# Patient Record
Sex: FEMALE | Race: WHITE | NOT HISPANIC OR LATINO | Employment: OTHER | URBAN - METROPOLITAN AREA
[De-identification: names, ages, dates, MRNs, and addresses within clinical notes are randomized per-mention and may not be internally consistent; named-entity substitution may affect disease eponyms.]

---

## 2019-11-12 ENCOUNTER — OFFICE VISIT (OUTPATIENT)
Dept: UROLOGY | Facility: CLINIC | Age: 64
End: 2019-11-12
Payer: MEDICARE

## 2019-11-12 VITALS
HEIGHT: 65 IN | SYSTOLIC BLOOD PRESSURE: 120 MMHG | WEIGHT: 165 LBS | HEART RATE: 64 BPM | BODY MASS INDEX: 27.49 KG/M2 | DIASTOLIC BLOOD PRESSURE: 62 MMHG

## 2019-11-12 DIAGNOSIS — N30.10 INTERSTITIAL CYSTITIS: Primary | ICD-10-CM

## 2019-11-12 DIAGNOSIS — R32 FEMALE INCONTINENCE: ICD-10-CM

## 2019-11-12 LAB
BILIRUB UR QL STRIP: NEGATIVE
GLUCOSE UR QL STRIP: POSITIVE
KETONES UR QL STRIP: POSITIVE
LEUKOCYTE ESTERASE UR QL STRIP: NEGATIVE
PH, POC UA: 5
POC AMORP, URINE: ABNORMAL
POC BACTI, URINE: ABNORMAL
POC BLOOD, URINE: NEGATIVE
POC CASTS, URINE: ABNORMAL
POC CRYST, URINE: ABNORMAL
POC EPITH, URINE: ABNORMAL
POC HCG, URINE: ABNORMAL
POC HYALIN, URINE: ABNORMAL LPF
POC MUCUS, URINE: ABNORMAL
POC NITRATES, URINE: NEGATIVE
POC OTHER, URINE: ABNORMAL
POC RBC, URINE: ABNORMAL HPF
POC WBC, URINE: ABNORMAL HPF
PROT UR QL STRIP: NEGATIVE
SP GR UR STRIP: 1.01 (ref 1–1.03)
UROBILINOGEN UR STRIP-ACNC: 0.2 (ref 0.1–1.1)

## 2019-11-12 PROCEDURE — 81001 POCT URINALYSIS (W/MICRO OPTION): ICD-10-PCS | Mod: S$GLB,,, | Performed by: UROLOGY

## 2019-11-12 PROCEDURE — 99213 OFFICE O/P EST LOW 20 MIN: CPT | Mod: 25,S$GLB,, | Performed by: UROLOGY

## 2019-11-12 PROCEDURE — 81001 URINALYSIS AUTO W/SCOPE: CPT | Mod: S$GLB,,, | Performed by: UROLOGY

## 2019-11-12 PROCEDURE — 99213 PR OFFICE/OUTPT VISIT, EST, LEVL III, 20-29 MIN: ICD-10-PCS | Mod: 25,S$GLB,, | Performed by: UROLOGY

## 2019-11-12 RX ORDER — SIMVASTATIN 20 MG/1
20 TABLET, FILM COATED ORAL NIGHTLY
Refills: 1 | COMMUNITY
Start: 2019-09-23 | End: 2021-08-23

## 2019-11-12 RX ORDER — MELOXICAM 7.5 MG/1
7.5 TABLET ORAL 2 TIMES DAILY PRN
Refills: 2 | COMMUNITY
Start: 2019-11-04 | End: 2020-08-20

## 2019-11-12 RX ORDER — GABAPENTIN 300 MG/1
CAPSULE ORAL
Refills: 4 | COMMUNITY
Start: 2019-10-22 | End: 2021-08-23

## 2019-11-12 RX ORDER — FENOFIBRATE 160 MG/1
160 TABLET ORAL DAILY
Refills: 1 | COMMUNITY
Start: 2019-10-24

## 2019-11-12 RX ORDER — PANTOPRAZOLE SODIUM 40 MG/1
TABLET, DELAYED RELEASE ORAL
Refills: 11 | COMMUNITY
Start: 2019-10-31

## 2019-11-12 RX ORDER — ROPINIROLE 2 MG/1
2 TABLET, FILM COATED ORAL NIGHTLY
Refills: 5 | COMMUNITY
Start: 2019-10-28 | End: 2021-08-23

## 2019-11-12 RX ORDER — AMITRIPTYLINE HYDROCHLORIDE 25 MG/1
50 TABLET, FILM COATED ORAL NIGHTLY
Refills: 6 | COMMUNITY
Start: 2019-11-08 | End: 2020-06-29 | Stop reason: SDUPTHER

## 2019-11-12 RX ORDER — METOPROLOL SUCCINATE 50 MG/1
50 TABLET, EXTENDED RELEASE ORAL DAILY
Refills: 5 | COMMUNITY
Start: 2019-10-22

## 2019-11-12 RX ORDER — ESTRADIOL 2 MG/1
2 TABLET ORAL DAILY
Refills: 11 | COMMUNITY
Start: 2019-10-31 | End: 2020-09-04

## 2019-11-12 RX ORDER — LISINOPRIL 10 MG/1
10 TABLET ORAL DAILY
Refills: 5 | COMMUNITY
Start: 2019-10-24 | End: 2023-09-14

## 2019-11-12 NOTE — PROGRESS NOTES
Subjective:       Patient ID: Erika Espinosa is a 64 y.o. female.    Chief Complaint: Other (6 mth F/U on IC, stress incotinence, cystocele,rectocele. Pt. states she had to stop taking Mybetriq due to the cost)      HPI: IC on elavil and diet.  Doing well with that.  H/o stress incontinence no leakage surgery was in 2012      Past Medical History:   Past Medical History:   Diagnosis Date    Depression     Diabetes     Hiatal hernia     Hyperlipidemia     Hypertension     IC (interstitial cystitis)     Peripheral neuropathy        Past Surgical Historical:   Past Surgical History:   Procedure Laterality Date    BACK SURGERY      bladder sling amp repair      HYSTERECTOMY      TOTAL HIP ARTHROPLASTY      bilat        Medications:   Medication List with Changes/Refills   Current Medications    AMITRIPTYLINE (ELAVIL) 25 MG TABLET    Take 50 mg by mouth nightly.    ESTRADIOL (ESTRACE) 2 MG TABLET    Take 2 mg by mouth once daily.    FENOFIBRATE 160 MG TAB    Take 160 mg by mouth once daily.    GABAPENTIN (NEURONTIN) 300 MG CAPSULE    TAKE 1 CAPSULE BY MOUTH 4 TIMES DAILY    LISINOPRIL 10 MG TABLET    Take 10 mg by mouth once daily.    MELOXICAM (MOBIC) 7.5 MG TABLET    Take 7.5 mg by mouth 2 (two) times daily as needed.    METOPROLOL SUCCINATE (TOPROL-XL) 50 MG 24 HR TABLET    Take 50 mg by mouth once daily.    PANTOPRAZOLE (PROTONIX) 40 MG TABLET    TAKE 1 TABLET BY MOUTH ONCE DAILY 1 2 HOUR PRIOR TO MORNING MEAL ON AN EMPTY STOMACH    ROPINIROLE (REQUIP) 2 MG TABLET    Take 2 mg by mouth every evening.    SIMVASTATIN (ZOCOR) 20 MG TABLET    Take 20 mg by mouth every evening.        Past Social History:   Social History     Socioeconomic History    Marital status:      Spouse name: Not on file    Number of children: Not on file    Years of education: Not on file    Highest education level: Not on file   Occupational History    Not on file   Social Needs    Financial resource strain: Not on file     Food insecurity:     Worry: Not on file     Inability: Not on file    Transportation needs:     Medical: Not on file     Non-medical: Not on file   Tobacco Use    Smoking status: Never Smoker   Substance and Sexual Activity    Alcohol use: Not Currently    Drug use: Not on file    Sexual activity: Not on file   Lifestyle    Physical activity:     Days per week: Not on file     Minutes per session: Not on file    Stress: Not on file   Relationships    Social connections:     Talks on phone: Not on file     Gets together: Not on file     Attends Pentecostal service: Not on file     Active member of club or organization: Not on file     Attends meetings of clubs or organizations: Not on file     Relationship status: Not on file   Other Topics Concern    Not on file   Social History Narrative    Not on file       Allergies: Review of patient's allergies indicates:  No Known Allergies     Family History:   Family History   Problem Relation Age of Onset    Cancer Father     Hypertension Mother     Heart disease Mother         Review of Systems:  Review of Systems   Constitutional: Negative for activity change and appetite change.   HENT: Negative for congestion and dental problem.    Respiratory: Negative for chest tightness and shortness of breath.    Cardiovascular: Negative for chest pain.   Gastrointestinal: Negative for abdominal distention.   Genitourinary: Negative for decreased urine volume, difficulty urinating, dyspareunia, dysuria, enuresis, flank pain, frequency, genital sores, hematuria, pelvic pain and urgency.   Musculoskeletal: Negative for back pain and neck pain.   Neurological: Negative for dizziness.   Hematological: Negative for adenopathy.   Psychiatric/Behavioral: Negative for agitation, behavioral problems and confusion.       Physical Exam:  Physical Exam   Nursing note and vitals reviewed.  Constitutional: She is oriented to person, place, and time. She appears well-developed and  well-nourished.   HENT:   Head: Normocephalic.   Cardiovascular: Normal rate, regular rhythm and normal heart sounds.    Pulmonary/Chest: Effort normal and breath sounds normal.   Abdominal: Soft. Bowel sounds are normal.   Neurological: She is alert and oriented to person, place, and time.   Skin: Skin is warm and dry.         Assessment/Plan:     IC doing well on present regimine.  Med refilled.  Stress incontinence no evidence of recurrence.  Fu 6 months  Problem List Items Addressed This Visit     None

## 2020-05-06 ENCOUNTER — OFFICE VISIT (OUTPATIENT)
Dept: UROLOGY | Facility: CLINIC | Age: 65
End: 2020-05-06
Payer: MEDICARE

## 2020-05-06 VITALS
SYSTOLIC BLOOD PRESSURE: 122 MMHG | BODY MASS INDEX: 26.84 KG/M2 | RESPIRATION RATE: 17 BRPM | HEIGHT: 66 IN | DIASTOLIC BLOOD PRESSURE: 76 MMHG | WEIGHT: 167 LBS

## 2020-05-06 DIAGNOSIS — N30.10 INTERSTITIAL CYSTITIS: Primary | ICD-10-CM

## 2020-05-06 DIAGNOSIS — R32 FEMALE INCONTINENCE: ICD-10-CM

## 2020-05-06 LAB
BILIRUB UR QL STRIP: NEGATIVE
GLUCOSE UR QL STRIP: POSITIVE
KETONES UR QL STRIP: NEGATIVE
LEUKOCYTE ESTERASE UR QL STRIP: NEGATIVE
PH, POC UA: 5
POC AMORP, URINE: ABNORMAL
POC BACTI, URINE: ABNORMAL
POC BLOOD, URINE: NEGATIVE
POC CASTS, URINE: ABNORMAL
POC CRYST, URINE: ABNORMAL
POC EPITH, URINE: ABNORMAL
POC HCG, URINE: ABNORMAL
POC HYALIN, URINE: ABNORMAL
POC MUCUS, URINE: ABNORMAL
POC NITRATES, URINE: NEGATIVE
POC OTHER, URINE: ABNORMAL
POC RBC, URINE: ABNORMAL
POC WBC, URINE: ABNORMAL
PROT UR QL STRIP: NEGATIVE
SP GR UR STRIP: 1.01 (ref 1–1.03)
UROBILINOGEN UR STRIP-ACNC: 0.2 (ref 0.1–1.1)

## 2020-05-06 PROCEDURE — 99214 OFFICE O/P EST MOD 30 MIN: CPT | Mod: 25,S$GLB,, | Performed by: UROLOGY

## 2020-05-06 PROCEDURE — 99214 PR OFFICE/OUTPT VISIT, EST, LEVL IV, 30-39 MIN: ICD-10-PCS | Mod: 25,S$GLB,, | Performed by: UROLOGY

## 2020-05-06 PROCEDURE — 51701 INSERT BLADDER CATHETER: CPT | Mod: S$GLB,,, | Performed by: UROLOGY

## 2020-05-06 PROCEDURE — 51701 INSERT,NON-INDWELLING BLADDER CATHETER: ICD-10-PCS | Mod: S$GLB,,, | Performed by: UROLOGY

## 2020-05-06 RX ORDER — EMPAGLIFLOZIN 25 MG/1
1 TABLET, FILM COATED ORAL DAILY
COMMUNITY
Start: 2020-03-30

## 2020-05-06 NOTE — PROGRESS NOTES
Subjective:       Patient ID: Erika Espinosa is a 64 y.o. female.    Chief Complaint: Urinary Incontinence and Nocturia (every 2 hours)      HPI: 63 yo female fu history of wendie and IC.  Pt is experiencing a lot of urgency frequency and incontinence.  Patient is getting up at night every two hours.  Pt states when she gets the urge she has to go right then.  Pt is also a diabetic       Past Medical History:   Past Medical History:   Diagnosis Date    Depression     Diabetes     Hiatal hernia     Hyperlipidemia     Hypertension     IC (interstitial cystitis)     Peripheral neuropathy        Past Surgical Historical:   Past Surgical History:   Procedure Laterality Date    BACK SURGERY      bladder sling amp repair      HYSTERECTOMY      TOTAL HIP ARTHROPLASTY      bilat        Medications:   Medication List with Changes/Refills   Current Medications    AMITRIPTYLINE (ELAVIL) 25 MG TABLET    Take 50 mg by mouth nightly.    ESTRADIOL (ESTRACE) 2 MG TABLET    Take 2 mg by mouth once daily.    FENOFIBRATE 160 MG TAB    Take 160 mg by mouth once daily.    GABAPENTIN (NEURONTIN) 300 MG CAPSULE    TAKE 1 CAPSULE BY MOUTH 4 TIMES DAILY    JARDIANCE 25 MG TAB    Take 1 tablet by mouth once daily.    LISINOPRIL 10 MG TABLET    Take 10 mg by mouth once daily.    MELOXICAM (MOBIC) 7.5 MG TABLET    Take 7.5 mg by mouth 2 (two) times daily as needed.    METOPROLOL SUCCINATE (TOPROL-XL) 50 MG 24 HR TABLET    Take 50 mg by mouth once daily.    PANTOPRAZOLE (PROTONIX) 40 MG TABLET    TAKE 1 TABLET BY MOUTH ONCE DAILY 1 2 HOUR PRIOR TO MORNING MEAL ON AN EMPTY STOMACH    ROPINIROLE (REQUIP) 2 MG TABLET    Take 2 mg by mouth every evening.    SIMVASTATIN (ZOCOR) 20 MG TABLET    Take 20 mg by mouth every evening.    SITAGLIPTAN-METFORMIN (JANUMET) 50-1,000 MG PER TABLET    Take 1 tablet by mouth 2 (two) times daily with meals.        Past Social History:   Social History     Socioeconomic History    Marital status:       Spouse name: Not on file    Number of children: Not on file    Years of education: Not on file    Highest education level: Not on file   Occupational History    Not on file   Social Needs    Financial resource strain: Not on file    Food insecurity:     Worry: Not on file     Inability: Not on file    Transportation needs:     Medical: Not on file     Non-medical: Not on file   Tobacco Use    Smoking status: Never Smoker   Substance and Sexual Activity    Alcohol use: Not Currently    Drug use: Not on file    Sexual activity: Not on file   Lifestyle    Physical activity:     Days per week: Not on file     Minutes per session: Not on file    Stress: Not on file   Relationships    Social connections:     Talks on phone: Not on file     Gets together: Not on file     Attends Bahai service: Not on file     Active member of club or organization: Not on file     Attends meetings of clubs or organizations: Not on file     Relationship status: Not on file   Other Topics Concern    Not on file   Social History Narrative    Not on file       Allergies: Review of patient's allergies indicates:  No Known Allergies     Family History:   Family History   Problem Relation Age of Onset    Cancer Father     Hypertension Mother     Heart disease Mother         Review of Systems:  Review of Systems   Constitutional: Negative for activity change and appetite change.   HENT: Negative for congestion and dental problem.    Respiratory: Negative for chest tightness and shortness of breath.    Cardiovascular: Negative for chest pain.   Gastrointestinal: Negative for abdominal distention and abdominal pain.   Genitourinary: Positive for frequency and urgency. Negative for decreased urine volume, difficulty urinating, dyspareunia, dysuria, enuresis, flank pain, genital sores, hematuria and pelvic pain.   Musculoskeletal: Negative for back pain and neck pain.   Allergic/Immunologic: Negative for immunocompromised state.    Neurological: Negative for dizziness.   Hematological: Negative for adenopathy.   Psychiatric/Behavioral: Negative for agitation, behavioral problems and confusion.       Physical Exam:  Physical Exam   Nursing note and vitals reviewed.  Constitutional: She is oriented to person, place, and time. She appears well-developed and well-nourished.   HENT:   Head: Normocephalic.   Eyes: Pupils are equal, round, and reactive to light.   Neck: Normal range of motion. Neck supple.   Cardiovascular: Normal rate, regular rhythm and normal heart sounds.    Pulmonary/Chest: Effort normal and breath sounds normal.   Abdominal: Soft. Bowel sounds are normal.   Genitourinary:         Musculoskeletal: Normal range of motion.   Neurological: She is alert and oriented to person, place, and time.   Skin: Skin is warm and dry.     Psychiatric: She has a normal mood and affect. Her behavior is normal.   ua is nl except for sugar    Assessment/Plan:     urgenecy with urge incontinence and IC--will place on ditropan and have fu in 4 weeks if no improvement will consider botox  Problem List Items Addressed This Visit     None      Visit Diagnoses     Interstitial cystitis    -  Primary    Relevant Orders    POCT Urinalysis (w/Micro Option)    INSERT,NON-INDWELLING BLADDER CATHETER    Female incontinence        Relevant Orders    POCT Urinalysis (w/Micro Option)    INSERT,NON-INDWELLING BLADDER CATHETER

## 2020-06-03 ENCOUNTER — OFFICE VISIT (OUTPATIENT)
Dept: UROLOGY | Facility: CLINIC | Age: 65
End: 2020-06-03
Payer: MEDICARE

## 2020-06-03 VITALS — SYSTOLIC BLOOD PRESSURE: 125 MMHG | RESPIRATION RATE: 16 BRPM | DIASTOLIC BLOOD PRESSURE: 78 MMHG

## 2020-06-03 DIAGNOSIS — R32 FEMALE INCONTINENCE: Primary | ICD-10-CM

## 2020-06-03 DIAGNOSIS — N30.10 INTERSTITIAL CYSTITIS: ICD-10-CM

## 2020-06-03 DIAGNOSIS — R82.71 BACILLURIA: ICD-10-CM

## 2020-06-03 LAB
BILIRUB UR QL STRIP: NEGATIVE
CLARITY, POC UA: ABNORMAL
COLOR, POC UA: ABNORMAL
GLUCOSE UR QL STRIP: NEGATIVE
KETONES UR QL STRIP: NEGATIVE
LEUKOCYTE ESTERASE UR QL STRIP: POSITIVE
NITRITE, POC UA: ABNORMAL
PH, POC UA: 7
POC AMORP, URINE: ABNORMAL
POC BACTI, URINE: ABNORMAL
POC BLOOD, URINE: NEGATIVE
POC CASTS, URINE: ABNORMAL
POC CRYST, URINE: ABNORMAL
POC EPITH, URINE: ABNORMAL
POC HCG, URINE: ABNORMAL
POC HYALIN, URINE: ABNORMAL LPF
POC MUCUS, URINE: ABNORMAL
POC NITRATES, URINE: POSITIVE
POC OTHER, URINE: ABNORMAL
POC RBC, URINE: ABNORMAL HPF
POC RESIDUAL URINE VOLUME: 36 ML (ref 0–100)
POC WBC, URINE: ABNORMAL HPF
PROT UR QL STRIP: NEGATIVE
SP GR UR STRIP: 1.02 (ref 1–1.03)
UROBILINOGEN UR STRIP-ACNC: 0.2 (ref 0.1–1.1)

## 2020-06-03 PROCEDURE — 51798 US URINE CAPACITY MEASURE: CPT | Mod: S$GLB,,, | Performed by: UROLOGY

## 2020-06-03 PROCEDURE — 99213 OFFICE O/P EST LOW 20 MIN: CPT | Mod: S$GLB,,, | Performed by: UROLOGY

## 2020-06-03 PROCEDURE — 99213 PR OFFICE/OUTPT VISIT, EST, LEVL III, 20-29 MIN: ICD-10-PCS | Mod: S$GLB,,, | Performed by: UROLOGY

## 2020-06-03 PROCEDURE — 51798 POCT BLADDER SCAN: ICD-10-PCS | Mod: S$GLB,,, | Performed by: UROLOGY

## 2020-06-03 RX ORDER — OXYBUTYNIN CHLORIDE 5 MG/1
TABLET ORAL
COMMUNITY
Start: 2020-05-06 | End: 2020-08-20

## 2020-06-03 NOTE — PROGRESS NOTES
Subjective:       Patient ID: Erika Espinosa is a 64 y.o. female.    Chief Complaint: Urinary Incontinence (Reports no change with ditropan)      HPI:  64-year-old female known service Dr. Farias of followed for symptoms of IC, urinary urgency urge incontinence.  Previously struggle with stress incontinence which is resolved with sling/Anterior repairprocedure.  She continues urgency urge incontinence having failed Myrbetriq and oxybutynin.  She continues on low-dose amitriptyline for IC and symptoms of overactive bladder as well.  She remains symptomatic.  Dr. Farias discussed  Botox injections in the bladder if she failed her most recent trial of oxybutynin.  On presentation today she reports she is in some proceeding in that direction.  No other urologic complaints       Past Medical History:   Past Medical History:   Diagnosis Date    Depression     Diabetes     Hiatal hernia     Hyperlipidemia     Hypertension     IC (interstitial cystitis)     Peripheral neuropathy        Past Surgical Historical:   Past Surgical History:   Procedure Laterality Date    BACK SURGERY      bladder sling amp repair      HYSTERECTOMY      TOTAL HIP ARTHROPLASTY      bilat        Medications:   Medication List with Changes/Refills   Current Medications    AMITRIPTYLINE (ELAVIL) 25 MG TABLET    Take 50 mg by mouth nightly.    ESTRADIOL (ESTRACE) 2 MG TABLET    Take 2 mg by mouth once daily.    FENOFIBRATE 160 MG TAB    Take 160 mg by mouth once daily.    GABAPENTIN (NEURONTIN) 300 MG CAPSULE    TAKE 1 CAPSULE BY MOUTH 4 TIMES DAILY    JARDIANCE 25 MG TAB    Take 1 tablet by mouth once daily.    LISINOPRIL 10 MG TABLET    Take 10 mg by mouth once daily.    MELOXICAM (MOBIC) 7.5 MG TABLET    Take 7.5 mg by mouth 2 (two) times daily as needed.    METOPROLOL SUCCINATE (TOPROL-XL) 50 MG 24 HR TABLET    Take 50 mg by mouth once daily.    OXYBUTYNIN (DITROPAN) 5 MG TAB        PANTOPRAZOLE (PROTONIX) 40 MG TABLET    TAKE 1  TABLET BY MOUTH ONCE DAILY 1 2 HOUR PRIOR TO MORNING MEAL ON AN EMPTY STOMACH    ROPINIROLE (REQUIP) 2 MG TABLET    Take 2 mg by mouth every evening.    SIMVASTATIN (ZOCOR) 20 MG TABLET    Take 20 mg by mouth every evening.    SITAGLIPTAN-METFORMIN (JANUMET) 50-1,000 MG PER TABLET    Take 1 tablet by mouth 2 (two) times daily with meals.        Past Social History:   Social History     Socioeconomic History    Marital status:      Spouse name: Not on file    Number of children: Not on file    Years of education: Not on file    Highest education level: Not on file   Occupational History    Not on file   Social Needs    Financial resource strain: Not on file    Food insecurity:     Worry: Not on file     Inability: Not on file    Transportation needs:     Medical: Not on file     Non-medical: Not on file   Tobacco Use    Smoking status: Never Smoker   Substance and Sexual Activity    Alcohol use: Not Currently    Drug use: Not on file    Sexual activity: Not on file   Lifestyle    Physical activity:     Days per week: Not on file     Minutes per session: Not on file    Stress: Not on file   Relationships    Social connections:     Talks on phone: Not on file     Gets together: Not on file     Attends Scientology service: Not on file     Active member of club or organization: Not on file     Attends meetings of clubs or organizations: Not on file     Relationship status: Not on file   Other Topics Concern    Not on file   Social History Narrative    Not on file       Allergies: Review of patient's allergies indicates:  No Known Allergies     Family History:   Family History   Problem Relation Age of Onset    Cancer Father     Hypertension Mother     Heart disease Mother         Review of Systems:  Review of Systems   Constitutional: Negative for activity change and appetite change.   HENT: Negative for congestion and dental problem.    Respiratory: Negative for chest tightness and shortness of  breath.    Cardiovascular: Negative for chest pain.   Gastrointestinal: Negative for abdominal distention.   Genitourinary: Negative for decreased urine volume, difficulty urinating, dyspareunia, dysuria, enuresis, flank pain, frequency, genital sores, hematuria, pelvic pain and urgency.   Musculoskeletal: Negative for back pain and neck pain.   Allergic/Immunologic: Negative for immunocompromised state.   Neurological: Negative for dizziness.   Hematological: Negative for adenopathy.   Psychiatric/Behavioral: Negative for agitation, behavioral problems and confusion.       Physical Exam:  Physical Exam   Constitutional: She is oriented to person, place, and time. She appears well-developed and well-nourished.   HENT:   Head: Normocephalic and atraumatic.   Eyes: No scleral icterus.   Neck: Normal range of motion.   Cardiovascular: Intact distal pulses.    Pulmonary/Chest: Effort normal and breath sounds normal.   Abdominal: Soft. She exhibits no distension. There is no tenderness.   Genitourinary: Rectum normal and vagina normal. Pelvic exam was performed with patient supine. There is no rash, tenderness or lesion on the right labia. There is no rash, tenderness or lesion on the left labia.   Musculoskeletal: She exhibits no edema.   Neurological: She is alert and oriented to person, place, and time.   Skin: Skin is warm and dry.     Psychiatric: She has a normal mood and affect.       Assessment/Plan:    overactive bladder-- symptoms urinary urgency urge incontinence under resolved with conservative therapy.  Patient interested in Botox injections of the bladder.  This have been discussed previously with her by Dr. Farias.  I again went over the procedure and risks versus benefits questions answered to patient's satisfaction.  Procedure to be scheduled.      UTI --urinalysis WBC 20-30 RBC 0-2 3+ bacteria and nitrite positive.  PVR 36 mL.  Patient is asymptomatic other than her chronic urgency urge incontinence.   Will wait on sensitivities to prescribed ABX  Problem List Items Addressed This Visit     None      Visit Diagnoses     Female incontinence    -  Primary    Relevant Orders    POCT Bladder Scan

## 2020-06-04 ENCOUNTER — TELEPHONE (OUTPATIENT)
Dept: UROLOGY | Facility: CLINIC | Age: 65
End: 2020-06-04

## 2020-06-04 DIAGNOSIS — N39.41 URGE INCONTINENCE: Primary | ICD-10-CM

## 2020-06-04 LAB — URINE CULTURE, ROUTINE: NORMAL

## 2020-06-04 NOTE — TELEPHONE ENCOUNTER
----- Message from Ann Marie Coburn sent at 6/4/2020  2:45 PM CDT -----  Contact: pt  Please call need a time for to sign paper work [phone]

## 2020-06-05 ENCOUNTER — CLINICAL SUPPORT (OUTPATIENT)
Dept: UROLOGY | Facility: CLINIC | Age: 65
End: 2020-06-05
Payer: MEDICARE

## 2020-06-05 DIAGNOSIS — N39.41 URGE INCONTINENCE: Primary | ICD-10-CM

## 2020-06-17 LAB
ANION GAP SERPL CALC-SCNC: 12 MMOL/L (ref 3–11)
APPEARANCE, UA: CLEAR
BASOPHILS NFR SNV MANUAL: 0.9 % (ref 0–3)
BILIRUB UR QL STRIP: NEGATIVE MG/DL
BUN SERPL-MCNC: 29 MG/DL (ref 7–18)
BUN/CREAT SERPL: 22.65 RATIO (ref 7–18)
CALCIUM BLD-MCNC: POSITIVE MG/DL
CALCIUM SERPL-MCNC: 9.6 MG/DL (ref 8.8–10.5)
CHLORIDE SERPL-SCNC: 99 MMOL/L (ref 100–108)
CO2 SERPL-SCNC: 27 MMOL/L (ref 21–32)
COLOR UR: ABNORMAL
COVID-19 AB, IGM: NEGATIVE
CREAT SERPL-MCNC: 1.28 MG/DL (ref 0.55–1.02)
EOSINOPHIL NFR SNV MANUAL: 2.6 % (ref 1–3)
ERYTHROCYTE [DISTWIDTH] IN BLOOD BY AUTOMATED COUNT: 13.1 % (ref 12.5–18)
GFR ESTIMATION: 42
GLUCOSE (UA): 1000 MG/DL
GLUCOSE SERPL-MCNC: 155 MG/DL (ref 70–110)
HCT VFR BLD AUTO: 43.3 % (ref 37–47)
HGB BLD-MCNC: 13.8 G/DL (ref 12–16)
HGB UR QL STRIP: NEGATIVE /UL
KETONES UR QL STRIP: NEGATIVE MG/DL
LEUKOCYTE ESTERASE UR QL STRIP: NEGATIVE /UL
LYMPHOCYTES NFR SNV MANUAL: 36.9 % (ref 25–40)
MANUAL NRBC PER 100 CELLS: 0 %
MCH RBC QN AUTO: 28.6 PG (ref 27–31.2)
MCHC RBC AUTO-ENTMCNC: 31.9 G/DL (ref 31.8–35.4)
MCV RBC AUTO: 89.6 FL (ref 80–97)
MONOCYTES/100 LEUKOCYTES: 8.1 % (ref 1–15)
NEUTROPHILS NFR BLD: 4.19 10*3/UL (ref 1.8–7.7)
NEUTROPHILS NFR SNV MANUAL: 51.3 % (ref 37–80)
NITRITE UR QL STRIP: NEGATIVE
PH UR STRIP: 5 PH (ref 5–9)
PLATELETS: 132 10*3/UL (ref 142–424)
POTASSIUM SERPL-SCNC: 4.2 MMOL/L (ref 3.6–5.2)
PROT UR QL STRIP: NEGATIVE MG/DL
RBC # BLD AUTO: 4.83 10*6/UL (ref 4.2–5.4)
SODIUM BLD-SCNC: 138 MMOL/L (ref 135–145)
SP GR UR STRIP: 1.02 (ref 1–1.03)
SPECIMEN COLLECTION METHOD, URINE: ABNORMAL
UROBILINOGEN UR STRIP-ACNC: NORMAL MG/DL
WBC # BLD: 8.2 10*3/UL (ref 4.6–10.2)

## 2020-06-18 ENCOUNTER — OUTSIDE PLACE OF SERVICE (OUTPATIENT)
Dept: UROLOGY | Facility: CLINIC | Age: 65
End: 2020-06-18
Payer: MEDICARE

## 2020-06-18 LAB
GLUCOSE SERPL-MCNC: 145 MG/DL (ref 70–105)
GLUCOSE SERPL-MCNC: 197 MG/DL (ref 70–105)

## 2020-06-18 PROCEDURE — 52287 CYSTOSCOPY CHEMODENERVATION: CPT | Mod: ,,, | Performed by: UROLOGY

## 2020-06-18 PROCEDURE — 52287 PR CYSTOURETHROSCOPY WITH INJ FOR CHEMODENERVATION: ICD-10-PCS | Mod: ,,, | Performed by: UROLOGY

## 2020-06-25 RX ORDER — FLUCONAZOLE 150 MG/1
150 TABLET ORAL DAILY
Qty: 2 TABLET | Refills: 0 | Status: SHIPPED | OUTPATIENT
Start: 2020-06-25 | End: 2020-06-26

## 2020-06-25 NOTE — TELEPHONE ENCOUNTER
----- Message from Wanda Echeverria sent at 6/25/2020  1:15 PM CDT -----  Contact: pt  Type:  Needs Medical Advice    Who Called: Erika  Symptoms (please be specific): yeast infection   How long has patient had these symptoms:  yesterday  Pharmacy name and phone #:    [id number] - [place], [place] - [address]  [address] [address]  Phone: [phone] Fax: [phone]    Would the patient rather a call back or a response via [facility]? call  Best Call Back Number: [phone]  Additional Information:

## 2020-06-26 ENCOUNTER — TELEPHONE (OUTPATIENT)
Dept: UROLOGY | Facility: CLINIC | Age: 65
End: 2020-06-26

## 2020-06-26 NOTE — TELEPHONE ENCOUNTER
----- Message from Morenita Bianchi sent at 6/26/2020 12:02 PM CDT -----  ..Type:  RX Refill Request    Who Called: Erika Espinosa  Refill or New Rx:refill  RX Name and Strength:Amitriptyline 25 mg  How is the patient currently taking it? (ex. 1XDay):2x daily  Is this a 30 day or 90 day RX:30  Preferred Pharmacy with phone number:.  [facility]  [place], [place] - [address] [address]  Phone: [phone] Fax: [phone]      Local or Mail Order:local  Ordering Provider:Dr. Gomez  Would the patient rather a call back or a response via [facility]? Call back  Best Call Back Number:[phone]`  Additional Information:

## 2020-06-29 RX ORDER — AMITRIPTYLINE HYDROCHLORIDE 25 MG/1
50 TABLET, FILM COATED ORAL NIGHTLY
Qty: 60 TABLET | Refills: 4 | Status: SHIPPED | OUTPATIENT
Start: 2020-06-29 | End: 2020-12-02 | Stop reason: SDUPTHER

## 2020-08-07 ENCOUNTER — OFFICE VISIT (OUTPATIENT)
Dept: UROLOGY | Facility: CLINIC | Age: 65
End: 2020-08-07
Payer: MEDICARE

## 2020-08-07 DIAGNOSIS — R30.0 DYSURIA: Primary | ICD-10-CM

## 2020-08-07 LAB — POC RESIDUAL URINE VOLUME: 55 ML (ref 0–100)

## 2020-08-07 PROCEDURE — 99214 PR OFFICE/OUTPT VISIT, EST, LEVL IV, 30-39 MIN: ICD-10-PCS | Mod: S$GLB,,, | Performed by: UROLOGY

## 2020-08-07 PROCEDURE — 51798 POCT BLADDER SCAN: ICD-10-PCS | Mod: S$GLB,,, | Performed by: UROLOGY

## 2020-08-07 PROCEDURE — 99214 OFFICE O/P EST MOD 30 MIN: CPT | Mod: S$GLB,,, | Performed by: UROLOGY

## 2020-08-07 PROCEDURE — 51798 US URINE CAPACITY MEASURE: CPT | Mod: S$GLB,,, | Performed by: UROLOGY

## 2020-08-07 NOTE — PROGRESS NOTES
63 yo fu sp botox injection.  This was her first injection and states it is working great.  Pt also has a history of incontinence sp surgery in past and also has IC which is controlled with meds  Subjective:       Patient ID: Erika Espinosa is a 64 y.o. female.    Chief Complaint: Other (3 week fu botox )      HPI: See above       Past Medical History:   Past Medical History:   Diagnosis Date    Depression     Diabetes     Hiatal hernia     Hyperlipidemia     Hypertension     IC (interstitial cystitis)     Peripheral neuropathy        Past Surgical Historical:   Past Surgical History:   Procedure Laterality Date    BACK SURGERY      bladder sling amp repair      HYSTERECTOMY      TOTAL HIP ARTHROPLASTY      bilat        Medications:   Medication List with Changes/Refills   Current Medications    AMITRIPTYLINE (ELAVIL) 25 MG TABLET    Take 2 tablets (50 mg total) by mouth nightly.    ESTRADIOL (ESTRACE) 2 MG TABLET    Take 2 mg by mouth once daily.    FENOFIBRATE 160 MG TAB    Take 160 mg by mouth once daily.    GABAPENTIN (NEURONTIN) 300 MG CAPSULE    TAKE 1 CAPSULE BY MOUTH 4 TIMES DAILY    JARDIANCE 25 MG TAB    Take 1 tablet by mouth once daily.    LISINOPRIL 10 MG TABLET    Take 10 mg by mouth once daily.    MELOXICAM (MOBIC) 7.5 MG TABLET    Take 7.5 mg by mouth 2 (two) times daily as needed.    METOPROLOL SUCCINATE (TOPROL-XL) 50 MG 24 HR TABLET    Take 50 mg by mouth once daily.    OXYBUTYNIN (DITROPAN) 5 MG TAB        PANTOPRAZOLE (PROTONIX) 40 MG TABLET    TAKE 1 TABLET BY MOUTH ONCE DAILY 1 2 HOUR PRIOR TO MORNING MEAL ON AN EMPTY STOMACH    ROPINIROLE (REQUIP) 2 MG TABLET    Take 2 mg by mouth every evening.    SIMVASTATIN (ZOCOR) 20 MG TABLET    Take 20 mg by mouth every evening.    SITAGLIPTAN-METFORMIN (JANUMET) 50-1,000 MG PER TABLET    Take 1 tablet by mouth 2 (two) times daily with meals.        Past Social History:   Social History     Socioeconomic History    Marital status:       Spouse name: Not on file    Number of children: Not on file    Years of education: Not on file    Highest education level: Not on file   Occupational History    Not on file   Social Needs    Financial resource strain: Not on file    Food insecurity     Worry: Not on file     Inability: Not on file    Transportation needs     Medical: Not on file     Non-medical: Not on file   Tobacco Use    Smoking status: Never Smoker   Substance and Sexual Activity    Alcohol use: Not Currently    Drug use: Not on file    Sexual activity: Not on file   Lifestyle    Physical activity     Days per week: Not on file     Minutes per session: Not on file    Stress: Not on file   Relationships    Social connections     Talks on phone: Not on file     Gets together: Not on file     Attends Amish service: Not on file     Active member of club or organization: Not on file     Attends meetings of clubs or organizations: Not on file     Relationship status: Not on file   Other Topics Concern    Not on file   Social History Narrative    Not on file       Allergies: Review of patient's allergies indicates:  No Known Allergies     Family History:   Family History   Problem Relation Age of Onset    Cancer Father     Hypertension Mother     Heart disease Mother         Review of Systems:  Review of Systems   Constitutional: Negative for activity change and appetite change.   HENT: Negative for congestion and dental problem.    Respiratory: Negative for chest tightness and shortness of breath.    Cardiovascular: Negative for chest pain.   Gastrointestinal: Negative for abdominal distention and abdominal pain.   Genitourinary: Negative for decreased urine volume, difficulty urinating, dyspareunia, dysuria, enuresis, flank pain, frequency, genital sores, hematuria, pelvic pain and urgency.   Musculoskeletal: Negative for back pain and neck pain.   Allergic/Immunologic: Negative for immunocompromised state.   Neurological:  Negative for dizziness.   Hematological: Negative for adenopathy.   Psychiatric/Behavioral: Negative for agitation, behavioral problems and confusion.       Physical Exam:  Physical Exam   Nursing note and vitals reviewed.  Constitutional: She is oriented to person, place, and time. She appears well-developed.   HENT:   Head: Normocephalic.   Eyes: Pupils are equal, round, and reactive to light.   Neck: Normal range of motion. Neck supple.   Cardiovascular: Normal rate, regular rhythm and normal heart sounds.    Pulmonary/Chest: Effort normal and breath sounds normal.   Abdominal: Soft. Bowel sounds are normal.   Musculoskeletal: Normal range of motion.   Neurological: She is alert and oriented to person, place, and time.   Skin: Skin is warm and dry.     Psychiatric: Her behavior is normal.   ua is nl    Assessment/Plan:     urge incontinence sp botox with good result    History of stress incontinence with no recurrence    IC doing well on present meds    Fu in 6 months  Problem List Items Addressed This Visit     None      Visit Diagnoses     Dysuria    -  Primary    Relevant Orders    POCT Bladder Scan    POCT Urinalysis (w/Micro Option)

## 2020-08-20 ENCOUNTER — OFFICE VISIT (OUTPATIENT)
Dept: OBSTETRICS AND GYNECOLOGY | Facility: CLINIC | Age: 65
End: 2020-08-20
Payer: MEDICARE

## 2020-08-20 VITALS
SYSTOLIC BLOOD PRESSURE: 122 MMHG | BODY MASS INDEX: 26.84 KG/M2 | HEIGHT: 66 IN | WEIGHT: 167 LBS | DIASTOLIC BLOOD PRESSURE: 60 MMHG

## 2020-08-20 DIAGNOSIS — Z01.419 GYNECOLOGIC EXAM NORMAL: Primary | ICD-10-CM

## 2020-08-20 DIAGNOSIS — Z12.31 ENCOUNTER FOR SCREENING MAMMOGRAM FOR MALIGNANT NEOPLASM OF BREAST: ICD-10-CM

## 2020-08-20 PROCEDURE — G0101 PR CA SCREEN;PELVIC/BREAST EXAM: ICD-10-PCS | Mod: S$GLB,,, | Performed by: NURSE PRACTITIONER

## 2020-08-20 PROCEDURE — G0101 CA SCREEN;PELVIC/BREAST EXAM: HCPCS | Mod: S$GLB,,, | Performed by: NURSE PRACTITIONER

## 2020-08-20 RX ORDER — FERROUS SULFATE 325(65) MG
1 TABLET ORAL DAILY
COMMUNITY
Start: 2020-07-23

## 2020-08-20 RX ORDER — EZETIMIBE 10 MG/1
TABLET ORAL
COMMUNITY
Start: 2020-08-19

## 2020-08-20 RX ORDER — HYDROCHLOROTHIAZIDE 25 MG/1
TABLET ORAL
COMMUNITY
Start: 2020-08-19

## 2020-08-20 NOTE — PROGRESS NOTES
Subjective:       Patient ID: Erika Espinosa is a 64 y.o. female.    Chief Complaint:  Well Woman (No pap. Pt has no complaints)      History of Present Illness  HPI  Annual Exam-Postmenopausal  Patient presents for annual exam. The patient has no complaints today. The patient is sexually active. GYN screening history: last pap: was normal. The patient is taking hormone replacement therapy. Patient denies post-menopausal vaginal bleeding.      Outpatient Medications Marked as Taking for the 20 encounter (Office Visit) with Alice Blas NP   Medication Sig Dispense Refill    amitriptyline (ELAVIL) 25 MG tablet Take 2 tablets (50 mg total) by mouth nightly. 60 tablet 4    estradiol (ESTRACE) 2 MG tablet Take 2 mg by mouth once daily.  11    ezetimibe (ZETIA) 10 mg tablet       fenofibrate 160 MG Tab Take 160 mg by mouth once daily.  1    ferrous sulfate (FEOSOL) 325 mg (65 mg iron) Tab tablet Take 1 tablet by mouth once daily.      gabapentin (NEURONTIN) 300 MG capsule TAKE 1 CAPSULE BY MOUTH 4 TIMES DAILY  4    hydroCHLOROthiazide (HYDRODIURIL) 25 MG tablet       JARDIANCE 25 mg Tab Take 1 tablet by mouth once daily.      lisinopril 10 MG tablet Take 10 mg by mouth once daily.  5    metoprolol succinate (TOPROL-XL) 50 MG 24 hr tablet Take 50 mg by mouth once daily.  5    pantoprazole (PROTONIX) 40 MG tablet TAKE 1 TABLET BY MOUTH ONCE DAILY 1 2 HOUR PRIOR TO MORNING MEAL ON AN EMPTY STOMACH  11    rOPINIRole (REQUIP) 2 MG tablet Take 2 mg by mouth every evening.  5    simvastatin (ZOCOR) 20 MG tablet Take 20 mg by mouth every evening.  1    SITagliptan-metformin (JANUMET) 50-1,000 mg per tablet Take 1 tablet by mouth 2 (two) times daily with meals.           GYN & OB History  No LMP recorded. Patient has had a hysterectomy.   Date of Last Pap: No result found    OB History    Para Term  AB Living   2 2           SAB TAB Ectopic Multiple Live Births                  # Outcome Date  GA Lbr Speedy/2nd Weight Sex Delivery Anes PTL Lv   2 Para      Vag-Spont      1 Para      Vag-Spont          Review of Systems  Review of Systems   Constitutional: Negative for activity change, appetite change, chills, fatigue and fever.   HENT: Negative for nasal congestion and tinnitus.    Eyes: Negative for visual disturbance.   Respiratory: Negative for cough and shortness of breath.    Cardiovascular: Negative for chest pain and palpitations.   Gastrointestinal: Negative for abdominal pain, bloating, blood in stool, constipation, nausea and vomiting.   Endocrine: Negative for diabetes, hair loss and hot flashes.   Genitourinary: Negative for bladder incontinence, decreased libido, dysmenorrhea, dyspareunia, dysuria, flank pain, frequency, genital sores, hematuria, hot flashes, menorrhagia, menstrual problem, pelvic pain, urgency, vaginal bleeding, vaginal discharge, vaginal pain, urinary incontinence, postcoital bleeding, postmenopausal bleeding, vaginal dryness and vaginal odor.   Musculoskeletal: Negative for arthralgias, back pain, leg pain and myalgias.   Integumentary:  Negative for rash, acne, hair changes, mole/lesion, breast mass, nipple discharge, breast skin changes and breast tenderness.   Neurological: Negative for vertigo, syncope, numbness and headaches.   Hematological: Does not bruise/bleed easily.   Psychiatric/Behavioral: Negative for depression and sleep disturbance. The patient is not nervous/anxious.    Breast: Negative for asymmetry, lump, mass, mastodynia, nipple discharge, skin changes and tenderness          Objective:    Physical Exam:   Constitutional: She appears well-developed and well-nourished.    HENT:   Nose: No epistaxis.     Neck: No thyroid mass and no thyromegaly present.     Pulmonary/Chest: Effort normal and breath sounds normal. Chest wall is not dull to percussion. She exhibits no mass, no tenderness, no bony tenderness, no laceration, no crepitus, no edema, no deformity,  no swelling and no retraction. Right breast exhibits no inverted nipple, no mass, no nipple discharge, no skin change, no tenderness, presence, no bleeding and no swelling. Left breast exhibits no inverted nipple, no mass, no nipple discharge, no skin change, no tenderness, presence, no bleeding and no swelling. Breasts are symmetrical.        Abdominal: Soft. Normal appearance and bowel sounds are normal. She exhibits no distension. There is no abdominal tenderness. Hernia confirmed negative in the right inguinal area and confirmed negative in the left inguinal area.     Genitourinary:    Vagina and rectum normal.      Pelvic exam was performed with patient supine.   There is no rash, tenderness, lesion or injury on the right labia. There is no rash, tenderness, lesion or injury on the left labia. Uterus is absent. Right adnexum displays no mass, no tenderness and no fullness. Left adnexum displays no mass, no tenderness and no fullness. No erythema, tenderness, bleeding, rectocele, cystocele or unspecified prolapse of vaginal walls in the vagina.    No foreign body in the vagina.      No signs of injury in the vagina.   Vaginal cuff normal.Labial bartholins normal.Cervix exhibits absence. negative for vaginal discharge               Skin: Skin is warm and dry.    Psychiatric: She has a normal mood and affect. Her speech is normal and behavior is normal.          Assessment:        1. Gynecologic exam normal                Plan:      Gynecologic exam normal    Encounter for screening mammogram for malignant neoplasm of breast  -     Mammo Digital Screening Bilat w/ Jose; Future; Expected date: 08/20/2020      FU one year    -     Pt was extensively counseled on menopause, including current treatment recommendations.  In particular, recommendation to cease HRT use by age 67 yo was discussed.  Risks associated with continued HRT use (including increased risk of heart disease, cardiac event, breast cancer, stoke, VTE,  "etc) and limited benefits of use past age 66 yo were reviewed.  Medical history was reviewed and pt does not have contraindications for HRT use other than age.  Lastly, pt was advised on possibility that insurance may not cover the cost of HRT in this scenario.  Pt voiced understanding and expressed desire to continue HRT use at her current dose as symptoms off of HRT are "unbearable".  OK to proceed with HRT at this time, however, pt was advised to consider tapering off of hormones in near future.  Pt again voiced understanding.            "

## 2020-09-17 ENCOUNTER — OFFICE VISIT (OUTPATIENT)
Dept: UROLOGY | Facility: CLINIC | Age: 65
End: 2020-09-17
Payer: MEDICARE

## 2020-09-17 VITALS — HEART RATE: 90 BPM | DIASTOLIC BLOOD PRESSURE: 65 MMHG | SYSTOLIC BLOOD PRESSURE: 141 MMHG

## 2020-09-17 DIAGNOSIS — N30.10 INTERSTITIAL CYSTITIS: ICD-10-CM

## 2020-09-17 DIAGNOSIS — R30.0 DYSURIA: Primary | ICD-10-CM

## 2020-09-17 PROCEDURE — 99213 OFFICE O/P EST LOW 20 MIN: CPT | Mod: S$GLB,,, | Performed by: UROLOGY

## 2020-09-17 PROCEDURE — 99213 PR OFFICE/OUTPT VISIT, EST, LEVL III, 20-29 MIN: ICD-10-PCS | Mod: S$GLB,,, | Performed by: UROLOGY

## 2020-09-17 NOTE — PROGRESS NOTES
Pt seen chart reviewed.  Pt unable to void and no urine return on cath.  For now she will finish antibiotics uribel provided fu Tuesday.  May need pelvic that day

## 2020-09-17 NOTE — PROGRESS NOTES
Subjective:       Patient ID: Erika Espinosa is a 64 y.o. female.    Chief Complaint: Urinary Tract Infection      HPI: 64-year-old female, patient Dr. Farias, presents with complaint of dysuria.  Patient states she began having pain with urination approximately 1 week ago.  Patient went to an urgent care on Saturday 09/12/2020.  Patient states she was diagnosed with urinary tract infection.  Patient states she was treated with an antibiotic injection and provided a prescription of Cipro.  Also patient believes she was given some Pyridium.  The patient states he initially had some improvement, however her symptoms have returned.  Patient presently complains of pain or burning with urination.  Patient states he has increase in urinary frequency.  Patient denies any odor to the urine.  Denies any fever.  Denies any blood in urine.  Patient states he has good bowel movements.  Patient denies flank pain.  Patient also has a history of interstitial cystitis.  Patient follows an IC diet and is on Elavil nightly.  Patient states this pain is not similar to that of a IC flare up.  Patient did have a Botox injection on 06/18/2020 due to pelvic pain and refractory urge incontinence.    No other urinary complaints.  All other health problems appear stable at this time.       Past Medical History:   Past Medical History:   Diagnosis Date    Atrophic vaginitis     Depression     Diabetes     Hiatal hernia     Hyperlipidemia     Hypertension     IC (interstitial cystitis)     Menopausal syndrome     Peripheral neuropathy     Vaginal pain        Past Surgical Historical:   Past Surgical History:   Procedure Laterality Date    BACK SURGERY      bladder sling amp repair      DIAGNOSTIC LAPAROSCOPY      ESOPHAGEAL DILATION      NEURO STIMULATOR IMPLANT      TONSILLECTOMY AND ADENOIDECTOMY      TOTAL ABDOMINAL HYSTERECTOMY      TOTAL HIP ARTHROPLASTY      bilat        Medications:   Medication List with  Changes/Refills   Current Medications    AMITRIPTYLINE (ELAVIL) 25 MG TABLET    Take 2 tablets (50 mg total) by mouth nightly.    ESTRADIOL (ESTRACE) 2 MG TABLET    Take 1 tablet by mouth once daily    EZETIMIBE (ZETIA) 10 MG TABLET        FENOFIBRATE 160 MG TAB    Take 160 mg by mouth once daily.    FERROUS SULFATE (FEOSOL) 325 MG (65 MG IRON) TAB TABLET    Take 1 tablet by mouth once daily.    GABAPENTIN (NEURONTIN) 300 MG CAPSULE    TAKE 1 CAPSULE BY MOUTH 4 TIMES DAILY    HYDROCHLOROTHIAZIDE (HYDRODIURIL) 25 MG TABLET        JARDIANCE 25 MG TAB    Take 1 tablet by mouth once daily.    LISINOPRIL 10 MG TABLET    Take 10 mg by mouth once daily.    METOPROLOL SUCCINATE (TOPROL-XL) 50 MG 24 HR TABLET    Take 50 mg by mouth once daily.    PANTOPRAZOLE (PROTONIX) 40 MG TABLET    TAKE 1 TABLET BY MOUTH ONCE DAILY 1 2 HOUR PRIOR TO MORNING MEAL ON AN EMPTY STOMACH    ROPINIROLE (REQUIP) 2 MG TABLET    Take 2 mg by mouth every evening.    SIMVASTATIN (ZOCOR) 20 MG TABLET    Take 20 mg by mouth every evening.    SITAGLIPTAN-METFORMIN (JANUMET) 50-1,000 MG PER TABLET    Take 1 tablet by mouth 2 (two) times daily with meals.        Past Social History:   Social History     Socioeconomic History    Marital status:      Spouse name: Not on file    Number of children: Not on file    Years of education: Not on file    Highest education level: Not on file   Occupational History    Not on file   Social Needs    Financial resource strain: Not on file    Food insecurity     Worry: Not on file     Inability: Not on file    Transportation needs     Medical: Not on file     Non-medical: Not on file   Tobacco Use    Smoking status: Never Smoker   Substance and Sexual Activity    Alcohol use: Not Currently    Drug use: Yes     Comment: Pain Management    Sexual activity: Yes     Partners: Male     Birth control/protection: See Surgical Hx     Comment: Hysterectomy   Lifestyle    Physical activity     Days per week:  Not on file     Minutes per session: Not on file    Stress: Not on file   Relationships    Social connections     Talks on phone: Not on file     Gets together: Not on file     Attends [employer]ism service: Not on file     Active member of club or organization: Not on file     Attends meetings of clubs or organizations: Not on file     Relationship status: Not on file   Other Topics Concern    Not on file   Social History Narrative    Not on file       Allergies: Review of patient's allergies indicates:  No Known Allergies     Family History:   Family History   Problem Relation Age of Onset    Prostate cancer Father 71    Hypertension Mother     Heart disease Mother     Breast cancer Paternal Aunt 60    Breast cancer Paternal Aunt     Breast cancer Paternal Aunt         Review of Systems:  Review of Systems   Constitutional: Negative for activity change and appetite change.   HENT: Negative for congestion and dental problem.    Respiratory: Negative for chest tightness and shortness of breath.    Cardiovascular: Negative for chest pain.   Gastrointestinal: Negative for abdominal distention.   Genitourinary: Positive for dysuria and frequency. Negative for decreased urine volume, difficulty urinating, dyspareunia, enuresis, flank pain, genital sores, hematuria, pelvic pain and urgency.   Musculoskeletal: Negative for back pain and neck pain.   Allergic/Immunologic: Negative for immunocompromised state.   Neurological: Negative for dizziness.   Hematological: Negative for adenopathy.   Psychiatric/Behavioral: Negative for agitation, behavioral problems and confusion.       Physical Exam:  Physical Exam   Nursing note and vitals reviewed.  Constitutional: She is oriented to person, place, and time. She appears well-developed.   HENT:   Head: Normocephalic.   Cardiovascular: Normal rate, regular rhythm and normal heart sounds.    Pulmonary/Chest: Effort normal and breath sounds normal.   Abdominal: Soft. Bowel  sounds are normal.   Neurological: She is alert and oriented to person, place, and time.   Skin: Skin is warm and dry.     Urinalysis:  Patient unable to provide urine sample.  PVR:  0  Bladder scan:  15 cc    Assessment/Plan:   Dysuria/UTI:  Patient encouraged to patient antibiotics as directed.  Patient encouraged increase fluids.    Will provide patient with Uribel.    Interstitial cystitis:  Patient encouraged to continue following IC diet and continue her Elavil as directed.    Patient will follow up in 4-5 days for re-evaluation after completing antibiotics.  Sooner if needed.  Problem List Items Addressed This Visit     None      Visit Diagnoses     Dysuria    -  Primary    Interstitial cystitis

## 2020-09-22 ENCOUNTER — OFFICE VISIT (OUTPATIENT)
Dept: UROLOGY | Facility: CLINIC | Age: 65
End: 2020-09-22
Payer: MEDICARE

## 2020-09-22 VITALS
HEIGHT: 66 IN | DIASTOLIC BLOOD PRESSURE: 76 MMHG | WEIGHT: 167 LBS | HEART RATE: 80 BPM | RESPIRATION RATE: 18 BRPM | BODY MASS INDEX: 26.84 KG/M2 | SYSTOLIC BLOOD PRESSURE: 118 MMHG

## 2020-09-22 DIAGNOSIS — R30.0 DYSURIA: Primary | ICD-10-CM

## 2020-09-22 PROCEDURE — 99213 OFFICE O/P EST LOW 20 MIN: CPT | Mod: S$GLB,,, | Performed by: UROLOGY

## 2020-09-22 PROCEDURE — 99213 PR OFFICE/OUTPT VISIT, EST, LEVL III, 20-29 MIN: ICD-10-PCS | Mod: S$GLB,,, | Performed by: UROLOGY

## 2020-09-22 NOTE — PROGRESS NOTES
Subjective:       Patient ID: Erika Espinosa is a 64 y.o. female.    Chief Complaint: Dysuria (1 week F/U)      HPI: 65 yo female fu recent uti.  Pt states all symptoms resolved.  Pt also has IC and takes elavil and follows diet closely       Past Medical History:   Past Medical History:   Diagnosis Date    Atrophic vaginitis     Depression     Diabetes     Hiatal hernia     Hyperlipidemia     Hypertension     IC (interstitial cystitis)     Menopausal syndrome     Peripheral neuropathy     Vaginal pain        Past Surgical Historical:   Past Surgical History:   Procedure Laterality Date    BACK SURGERY      bladder sling amp repair      DIAGNOSTIC LAPAROSCOPY      ESOPHAGEAL DILATION      NEURO STIMULATOR IMPLANT      TONSILLECTOMY AND ADENOIDECTOMY      TOTAL ABDOMINAL HYSTERECTOMY      TOTAL HIP ARTHROPLASTY      bilat        Medications:   Medication List with Changes/Refills   Current Medications    AMITRIPTYLINE (ELAVIL) 25 MG TABLET    Take 2 tablets (50 mg total) by mouth nightly.    ESTRADIOL (ESTRACE) 2 MG TABLET    Take 1 tablet by mouth once daily    EZETIMIBE (ZETIA) 10 MG TABLET        FENOFIBRATE 160 MG TAB    Take 160 mg by mouth once daily.    FERROUS SULFATE (FEOSOL) 325 MG (65 MG IRON) TAB TABLET    Take 1 tablet by mouth once daily.    GABAPENTIN (NEURONTIN) 300 MG CAPSULE    TAKE 1 CAPSULE BY MOUTH 4 TIMES DAILY    HYDROCHLOROTHIAZIDE (HYDRODIURIL) 25 MG TABLET        JARDIANCE 25 MG TAB    Take 1 tablet by mouth once daily.    LISINOPRIL 10 MG TABLET    Take 10 mg by mouth once daily.    METOPROLOL SUCCINATE (TOPROL-XL) 50 MG 24 HR TABLET    Take 50 mg by mouth once daily.    PANTOPRAZOLE (PROTONIX) 40 MG TABLET    TAKE 1 TABLET BY MOUTH ONCE DAILY 1 2 HOUR PRIOR TO MORNING MEAL ON AN EMPTY STOMACH    ROPINIROLE (REQUIP) 2 MG TABLET    Take 2 mg by mouth every evening.    SIMVASTATIN (ZOCOR) 20 MG TABLET    Take 20 mg by mouth every evening.    SITAGLIPTAN-METFORMIN (JANUMET)  50-1,000 MG PER TABLET    Take 1 tablet by mouth 2 (two) times daily with meals.        Past Social History:   Social History     Socioeconomic History    Marital status:      Spouse name: Not on file    Number of children: Not on file    Years of education: Not on file    Highest education level: Not on file   Occupational History    Not on file   Social Needs    Financial resource strain: Not on file    Food insecurity     Worry: Not on file     Inability: Not on file    Transportation needs     Medical: Not on file     Non-medical: Not on file   Tobacco Use    Smoking status: Never Smoker   Substance and Sexual Activity    Alcohol use: Not Currently    Drug use: Yes     Comment: Pain Management    Sexual activity: Yes     Partners: Male     Birth control/protection: See Surgical Hx     Comment: Hysterectomy   Lifestyle    Physical activity     Days per week: Not on file     Minutes per session: Not on file    Stress: Not on file   Relationships    Social connections     Talks on phone: Not on file     Gets together: Not on file     Attends [employer] service: Not on file     Active member of club or organization: Not on file     Attends meetings of clubs or organizations: Not on file     Relationship status: Not on file   Other Topics Concern    Not on file   Social History Narrative    Not on file       Allergies: Review of patient's allergies indicates:  No Known Allergies     Family History:   Family History   Problem Relation Age of Onset    Prostate cancer Father 71    Hypertension Mother     Heart disease Mother     Breast cancer Paternal Aunt 60    Breast cancer Paternal Aunt     Breast cancer Paternal Aunt         Review of Systems:  Review of Systems   Constitutional: Negative for activity change and appetite change.   HENT: Negative for congestion and dental problem.    Respiratory: Negative for chest tightness and shortness of breath.    Cardiovascular: Negative for chest  pain.   Gastrointestinal: Negative for abdominal distention.   Genitourinary: Negative for decreased urine volume, difficulty urinating, dyspareunia, dysuria, enuresis, flank pain, frequency, genital sores, hematuria, pelvic pain and urgency.   Musculoskeletal: Negative for back pain and neck pain.   Allergic/Immunologic: Negative for immunocompromised state.   Neurological: Negative for dizziness.   Hematological: Negative for adenopathy.   Psychiatric/Behavioral: Negative for agitation, behavioral problems and confusion.       Physical Exam:  Physical Exam   Nursing note and vitals reviewed.  Constitutional: She is oriented to person, place, and time. She appears well-developed.   HENT:   Head: Normocephalic.   Cardiovascular: Normal rate, regular rhythm and normal heart sounds.    Pulmonary/Chest: Effort normal and breath sounds normal.   Abdominal: Soft. Bowel sounds are normal.   Neurological: She is alert and oriented to person, place, and time.   Skin: Skin is warm and dry.     ua is nl    Assessment/Plan:     uti resolved    IC doing well on present regimine will continue the same    Fu in February as originally schedilled  Problem List Items Addressed This Visit     None      Visit Diagnoses     Dysuria    -  Primary    Relevant Orders    POCT Urinalysis (w/Micro Option)

## 2020-10-22 ENCOUNTER — PATIENT MESSAGE (OUTPATIENT)
Dept: OBSTETRICS AND GYNECOLOGY | Facility: CLINIC | Age: 65
End: 2020-10-22

## 2020-12-02 RX ORDER — AMITRIPTYLINE HYDROCHLORIDE 25 MG/1
50 TABLET, FILM COATED ORAL NIGHTLY
Qty: 60 TABLET | Refills: 4 | Status: SHIPPED | OUTPATIENT
Start: 2020-12-02 | End: 2021-04-30 | Stop reason: SDUPTHER

## 2020-12-02 NOTE — TELEPHONE ENCOUNTER
----- Message from Gianna Wise sent at 12/2/2020 10:49 AM CST -----  Regarding: pt  Pt called in regard to medication refill. Pt is checking status. Please call back at [phone]  amitriptyline (ELAVIL) 25 MG tablet      [facility] [address] [place] [id number]  Phone: [phone] Fax: [phone]

## 2021-02-09 ENCOUNTER — OFFICE VISIT (OUTPATIENT)
Dept: UROLOGY | Facility: CLINIC | Age: 66
End: 2021-02-09
Payer: MEDICARE

## 2021-02-09 VITALS
HEIGHT: 66 IN | HEART RATE: 80 BPM | BODY MASS INDEX: 26.84 KG/M2 | WEIGHT: 167 LBS | DIASTOLIC BLOOD PRESSURE: 78 MMHG | RESPIRATION RATE: 18 BRPM | SYSTOLIC BLOOD PRESSURE: 132 MMHG

## 2021-02-09 DIAGNOSIS — N30.10 INTERSTITIAL CYSTITIS: Primary | ICD-10-CM

## 2021-02-09 PROCEDURE — 99213 PR OFFICE/OUTPT VISIT, EST, LEVL III, 20-29 MIN: ICD-10-PCS | Mod: S$GLB,,, | Performed by: NURSE PRACTITIONER

## 2021-02-09 PROCEDURE — 99213 OFFICE O/P EST LOW 20 MIN: CPT | Mod: S$GLB,,, | Performed by: NURSE PRACTITIONER

## 2021-04-30 RX ORDER — AMITRIPTYLINE HYDROCHLORIDE 25 MG/1
50 TABLET, FILM COATED ORAL NIGHTLY
Qty: 60 TABLET | Refills: 4 | Status: SHIPPED | OUTPATIENT
Start: 2021-04-30 | End: 2021-05-03 | Stop reason: SDUPTHER

## 2021-05-03 RX ORDER — AMITRIPTYLINE HYDROCHLORIDE 25 MG/1
50 TABLET, FILM COATED ORAL NIGHTLY
Qty: 60 TABLET | Refills: 5 | Status: SHIPPED | OUTPATIENT
Start: 2021-05-03 | End: 2022-03-15 | Stop reason: SDUPTHER

## 2021-08-10 ENCOUNTER — OFFICE VISIT (OUTPATIENT)
Dept: UROLOGY | Facility: CLINIC | Age: 66
End: 2021-08-10
Payer: MEDICARE

## 2021-08-10 VITALS
SYSTOLIC BLOOD PRESSURE: 148 MMHG | WEIGHT: 169 LBS | HEIGHT: 66 IN | HEART RATE: 80 BPM | DIASTOLIC BLOOD PRESSURE: 65 MMHG | BODY MASS INDEX: 27.16 KG/M2

## 2021-08-10 DIAGNOSIS — R39.15 URINARY URGENCY: Primary | ICD-10-CM

## 2021-08-10 DIAGNOSIS — N39.41 URGE URINARY INCONTINENCE: ICD-10-CM

## 2021-08-10 PROCEDURE — 99213 PR OFFICE/OUTPT VISIT, EST, LEVL III, 20-29 MIN: ICD-10-PCS | Mod: S$GLB,,, | Performed by: UROLOGY

## 2021-08-10 PROCEDURE — 99213 OFFICE O/P EST LOW 20 MIN: CPT | Mod: S$GLB,,, | Performed by: UROLOGY

## 2021-08-23 ENCOUNTER — OFFICE VISIT (OUTPATIENT)
Dept: OBSTETRICS AND GYNECOLOGY | Facility: CLINIC | Age: 66
End: 2021-08-23
Payer: MEDICARE

## 2021-08-23 VITALS
SYSTOLIC BLOOD PRESSURE: 114 MMHG | HEART RATE: 67 BPM | HEIGHT: 66 IN | BODY MASS INDEX: 27.86 KG/M2 | DIASTOLIC BLOOD PRESSURE: 58 MMHG | WEIGHT: 173.38 LBS

## 2021-08-23 DIAGNOSIS — Z12.31 ENCOUNTER FOR SCREENING MAMMOGRAM FOR MALIGNANT NEOPLASM OF BREAST: ICD-10-CM

## 2021-08-23 DIAGNOSIS — Z78.0 MENOPAUSE: Primary | ICD-10-CM

## 2021-08-23 DIAGNOSIS — Z13.820 SCREENING FOR OSTEOPOROSIS: ICD-10-CM

## 2021-08-23 DIAGNOSIS — R23.2 HOT FLASHES: ICD-10-CM

## 2021-08-23 PROCEDURE — 99214 OFFICE O/P EST MOD 30 MIN: CPT | Mod: 25,S$GLB,, | Performed by: NURSE PRACTITIONER

## 2021-08-23 PROCEDURE — 99214 PR OFFICE/OUTPT VISIT, EST, LEVL IV, 30-39 MIN: ICD-10-PCS | Mod: 25,S$GLB,, | Performed by: NURSE PRACTITIONER

## 2021-08-23 PROCEDURE — 96372 THER/PROPH/DIAG INJ SC/IM: CPT | Mod: S$GLB,,, | Performed by: NURSE PRACTITIONER

## 2021-08-23 PROCEDURE — 96372 PR INJECTION,THERAP/PROPH/DIAG2ST, IM OR SUBCUT: ICD-10-PCS | Mod: S$GLB,,, | Performed by: NURSE PRACTITIONER

## 2021-08-23 RX ORDER — ESTRADIOL VALERATE 40 MG/ML
40 INJECTION INTRAMUSCULAR
Status: COMPLETED | OUTPATIENT
Start: 2021-08-23 | End: 2021-08-23

## 2021-08-23 RX ORDER — SIMVASTATIN 80 MG/1
80 TABLET, FILM COATED ORAL NIGHTLY
COMMUNITY
Start: 2021-05-23

## 2021-08-23 RX ORDER — CELECOXIB 100 MG/1
CAPSULE ORAL
COMMUNITY
Start: 2021-03-30 | End: 2021-08-23

## 2021-08-23 RX ORDER — GABAPENTIN 600 MG/1
600 TABLET ORAL 3 TIMES DAILY
COMMUNITY
Start: 2021-08-20

## 2021-08-23 RX ADMIN — ESTRADIOL VALERATE 40 MG: 40 INJECTION INTRAMUSCULAR at 10:08

## 2021-09-23 ENCOUNTER — CLINICAL SUPPORT (OUTPATIENT)
Dept: OBSTETRICS AND GYNECOLOGY | Facility: CLINIC | Age: 66
End: 2021-09-23
Payer: MEDICARE

## 2021-09-23 DIAGNOSIS — N95.1 MENOPAUSE SYNDROME: Primary | ICD-10-CM

## 2021-09-23 PROCEDURE — 96372 PR INJECTION,THERAP/PROPH/DIAG2ST, IM OR SUBCUT: ICD-10-PCS | Mod: S$GLB,,, | Performed by: NURSE PRACTITIONER

## 2021-09-23 PROCEDURE — 96372 THER/PROPH/DIAG INJ SC/IM: CPT | Mod: S$GLB,,, | Performed by: NURSE PRACTITIONER

## 2021-09-23 RX ORDER — ESTRADIOL VALERATE 20 MG/ML
40 INJECTION INTRAMUSCULAR
Status: COMPLETED | OUTPATIENT
Start: 2021-09-23 | End: 2021-09-23

## 2021-09-23 RX ADMIN — ESTRADIOL VALERATE 40 MG: 20 INJECTION INTRAMUSCULAR at 01:09

## 2021-10-18 ENCOUNTER — PATIENT MESSAGE (OUTPATIENT)
Dept: UROLOGY | Facility: CLINIC | Age: 66
End: 2021-10-18
Payer: MEDICARE

## 2021-10-25 ENCOUNTER — TELEPHONE (OUTPATIENT)
Dept: OBSTETRICS AND GYNECOLOGY | Facility: CLINIC | Age: 66
End: 2021-10-25

## 2021-10-25 ENCOUNTER — CLINICAL SUPPORT (OUTPATIENT)
Dept: OBSTETRICS AND GYNECOLOGY | Facility: CLINIC | Age: 66
End: 2021-10-25
Payer: MEDICARE

## 2021-10-25 ENCOUNTER — PATIENT MESSAGE (OUTPATIENT)
Dept: OBSTETRICS AND GYNECOLOGY | Facility: CLINIC | Age: 66
End: 2021-10-25

## 2021-10-25 DIAGNOSIS — N64.4 BREAST PAIN, LEFT: Primary | ICD-10-CM

## 2021-10-25 DIAGNOSIS — Z78.0 MENOPAUSE: Primary | ICD-10-CM

## 2021-10-26 ENCOUNTER — PATIENT MESSAGE (OUTPATIENT)
Dept: OBSTETRICS AND GYNECOLOGY | Facility: CLINIC | Age: 66
End: 2021-10-26
Payer: MEDICARE

## 2021-10-27 PROCEDURE — 96372 THER/PROPH/DIAG INJ SC/IM: CPT | Mod: S$GLB,,, | Performed by: NURSE PRACTITIONER

## 2021-10-27 PROCEDURE — 96372 PR INJECTION,THERAP/PROPH/DIAG2ST, IM OR SUBCUT: ICD-10-PCS | Mod: S$GLB,,, | Performed by: NURSE PRACTITIONER

## 2021-10-27 RX ORDER — ESTRADIOL VALERATE 20 MG/ML
40 INJECTION INTRAMUSCULAR
Status: COMPLETED | OUTPATIENT
Start: 2021-10-27 | End: 2021-10-27

## 2021-10-27 RX ADMIN — ESTRADIOL VALERATE 40 MG: 20 INJECTION INTRAMUSCULAR at 02:10

## 2021-11-02 ENCOUNTER — PATIENT MESSAGE (OUTPATIENT)
Dept: OBSTETRICS AND GYNECOLOGY | Facility: CLINIC | Age: 66
End: 2021-11-02
Payer: MEDICARE

## 2021-11-11 ENCOUNTER — TELEPHONE (OUTPATIENT)
Dept: OBSTETRICS AND GYNECOLOGY | Facility: CLINIC | Age: 66
End: 2021-11-11
Payer: MEDICARE

## 2021-12-27 ENCOUNTER — CLINICAL SUPPORT (OUTPATIENT)
Dept: OBSTETRICS AND GYNECOLOGY | Facility: CLINIC | Age: 66
End: 2021-12-27
Payer: MEDICARE

## 2021-12-27 DIAGNOSIS — Z78.0 MENOPAUSE: Primary | ICD-10-CM

## 2021-12-27 DIAGNOSIS — N95.1 MENOPAUSE SYNDROME: ICD-10-CM

## 2021-12-27 PROCEDURE — 96372 PR INJECTION,THERAP/PROPH/DIAG2ST, IM OR SUBCUT: ICD-10-PCS | Mod: S$GLB,,, | Performed by: NURSE PRACTITIONER

## 2021-12-27 PROCEDURE — 96372 THER/PROPH/DIAG INJ SC/IM: CPT | Mod: S$GLB,,, | Performed by: NURSE PRACTITIONER

## 2021-12-27 RX ORDER — ESTRADIOL VALERATE 40 MG/ML
40 INJECTION INTRAMUSCULAR ONCE
Status: COMPLETED | OUTPATIENT
Start: 2021-12-27 | End: 2021-12-27

## 2021-12-27 RX ADMIN — ESTRADIOL VALERATE 40 MG: 40 INJECTION INTRAMUSCULAR at 10:12

## 2022-01-26 ENCOUNTER — CLINICAL SUPPORT (OUTPATIENT)
Dept: OBSTETRICS AND GYNECOLOGY | Facility: CLINIC | Age: 67
End: 2022-01-26
Payer: MEDICARE

## 2022-01-26 DIAGNOSIS — Z78.0 MENOPAUSE: Primary | ICD-10-CM

## 2022-01-26 PROCEDURE — 96372 PR INJECTION,THERAP/PROPH/DIAG2ST, IM OR SUBCUT: ICD-10-PCS | Mod: S$GLB,,, | Performed by: NURSE PRACTITIONER

## 2022-01-26 PROCEDURE — 96372 THER/PROPH/DIAG INJ SC/IM: CPT | Mod: S$GLB,,, | Performed by: NURSE PRACTITIONER

## 2022-01-26 RX ORDER — ESTRADIOL VALERATE 40 MG/ML
40 INJECTION INTRAMUSCULAR ONCE
Status: COMPLETED | OUTPATIENT
Start: 2022-01-26 | End: 2022-01-26

## 2022-01-26 RX ADMIN — ESTRADIOL VALERATE 40 MG: 40 INJECTION INTRAMUSCULAR at 11:01

## 2022-01-26 NOTE — PROGRESS NOTES
Pt came into office today for her Estradiol injection. Allergies and medications reviewed prior to administration. Delestrogen 40mg/ml administered to Left Ventrogluteal. Pt tolerated well. Yoselyn Campbell

## 2022-02-28 ENCOUNTER — CLINICAL SUPPORT (OUTPATIENT)
Dept: OBSTETRICS AND GYNECOLOGY | Facility: CLINIC | Age: 67
End: 2022-02-28
Payer: MEDICARE

## 2022-02-28 DIAGNOSIS — N64.4 BREAST PAIN, LEFT: Primary | ICD-10-CM

## 2022-02-28 DIAGNOSIS — N95.1 MENOPAUSE SYNDROME: ICD-10-CM

## 2022-02-28 PROCEDURE — 96372 PR INJECTION,THERAP/PROPH/DIAG2ST, IM OR SUBCUT: ICD-10-PCS | Mod: S$GLB,,, | Performed by: OBSTETRICS & GYNECOLOGY

## 2022-02-28 PROCEDURE — 96372 THER/PROPH/DIAG INJ SC/IM: CPT | Mod: S$GLB,,, | Performed by: OBSTETRICS & GYNECOLOGY

## 2022-02-28 RX ORDER — ESTRADIOL VALERATE 40 MG/ML
40 INJECTION INTRAMUSCULAR
Status: COMPLETED | OUTPATIENT
Start: 2022-02-28 | End: 2022-02-28

## 2022-02-28 RX ADMIN — ESTRADIOL VALERATE 40 MG: 40 INJECTION INTRAMUSCULAR at 11:02

## 2022-03-15 ENCOUNTER — PATIENT MESSAGE (OUTPATIENT)
Dept: UROLOGY | Facility: CLINIC | Age: 67
End: 2022-03-15
Payer: MEDICARE

## 2022-03-15 DIAGNOSIS — N30.10 INTERSTITIAL CYSTITIS: Primary | ICD-10-CM

## 2022-03-15 RX ORDER — AMITRIPTYLINE HYDROCHLORIDE 25 MG/1
50 TABLET, FILM COATED ORAL NIGHTLY
Qty: 60 TABLET | Refills: 5 | Status: SHIPPED | OUTPATIENT
Start: 2022-03-15 | End: 2022-09-12

## 2022-03-15 RX ORDER — AMITRIPTYLINE HYDROCHLORIDE 25 MG/1
25 TABLET, FILM COATED ORAL NIGHTLY PRN
Qty: 30 TABLET | Refills: 6 | Status: SHIPPED | OUTPATIENT
Start: 2022-03-15 | End: 2022-09-12

## 2022-03-29 ENCOUNTER — CLINICAL SUPPORT (OUTPATIENT)
Dept: OBSTETRICS AND GYNECOLOGY | Facility: CLINIC | Age: 67
End: 2022-03-29
Payer: MEDICARE

## 2022-03-29 DIAGNOSIS — N95.1 MENOPAUSE SYNDROME: Primary | ICD-10-CM

## 2022-03-29 PROCEDURE — 96372 PR INJECTION,THERAP/PROPH/DIAG2ST, IM OR SUBCUT: ICD-10-PCS | Mod: S$GLB,,, | Performed by: NURSE PRACTITIONER

## 2022-03-29 PROCEDURE — 96372 THER/PROPH/DIAG INJ SC/IM: CPT | Mod: S$GLB,,, | Performed by: NURSE PRACTITIONER

## 2022-03-29 RX ORDER — ESTRADIOL VALERATE 40 MG/ML
40 INJECTION INTRAMUSCULAR
Status: COMPLETED | OUTPATIENT
Start: 2022-03-29 | End: 2022-03-29

## 2022-03-29 RX ORDER — BACLOFEN 10 MG/1
TABLET ORAL
COMMUNITY
Start: 2022-03-08 | End: 2022-09-12

## 2022-03-29 RX ADMIN — ESTRADIOL VALERATE 40 MG: 40 INJECTION INTRAMUSCULAR at 11:03

## 2022-03-29 NOTE — NURSING
I verified patient, gave her 1ml of estradiol. She tolerated the shot well and sat in exam room for 15 minutes post injection.

## 2022-04-29 ENCOUNTER — CLINICAL SUPPORT (OUTPATIENT)
Dept: OBSTETRICS AND GYNECOLOGY | Facility: CLINIC | Age: 67
End: 2022-04-29
Payer: MEDICARE

## 2022-04-29 VITALS — BODY MASS INDEX: 28.12 KG/M2 | WEIGHT: 175 LBS | HEIGHT: 66 IN

## 2022-04-29 DIAGNOSIS — N95.1 MENOPAUSE SYNDROME: Primary | ICD-10-CM

## 2022-04-29 PROCEDURE — 96372 PR INJECTION,THERAP/PROPH/DIAG2ST, IM OR SUBCUT: ICD-10-PCS | Mod: S$GLB,,, | Performed by: NURSE PRACTITIONER

## 2022-04-29 PROCEDURE — 96372 THER/PROPH/DIAG INJ SC/IM: CPT | Mod: S$GLB,,, | Performed by: NURSE PRACTITIONER

## 2022-04-29 RX ORDER — ESTRADIOL VALERATE 40 MG/ML
40 INJECTION INTRAMUSCULAR
Status: COMPLETED | OUTPATIENT
Start: 2022-04-29 | End: 2022-04-29

## 2022-04-29 RX ADMIN — ESTRADIOL VALERATE 40 MG: 40 INJECTION INTRAMUSCULAR at 10:04

## 2022-04-29 NOTE — NURSING
I verified patient, by  and name. She tolerated the shot well. She was advised to wait in exam room for 15 minutes post injection.

## 2022-05-18 ENCOUNTER — PATIENT MESSAGE (OUTPATIENT)
Dept: UROLOGY | Facility: CLINIC | Age: 67
End: 2022-05-18
Payer: MEDICARE

## 2022-05-19 ENCOUNTER — TELEPHONE (OUTPATIENT)
Dept: UROLOGY | Facility: CLINIC | Age: 67
End: 2022-05-19
Payer: MEDICARE

## 2022-05-24 ENCOUNTER — TELEPHONE (OUTPATIENT)
Dept: UROLOGY | Facility: CLINIC | Age: 67
End: 2022-05-24
Payer: MEDICARE

## 2022-05-24 NOTE — TELEPHONE ENCOUNTER
Informed pt we will refill medication when she is done with this script.    ----- Message from Marzena Bowling sent at 5/24/2022 10:44 AM CDT -----  Type:  Patient Returning Call    Who Called:Erika Espinosa    Who Left Message for Patient: Maria D   Does the patient know what this is regarding?: -   Would the patient rather a call back or a response via [facility]?    Best Call Back Number:[phone]    Additional Information:  returning your call

## 2022-05-30 ENCOUNTER — CLINICAL SUPPORT (OUTPATIENT)
Dept: OBSTETRICS AND GYNECOLOGY | Facility: CLINIC | Age: 67
End: 2022-05-30
Payer: MEDICARE

## 2022-05-30 VITALS — BODY MASS INDEX: 28.12 KG/M2 | HEIGHT: 66 IN | WEIGHT: 175 LBS

## 2022-05-30 PROCEDURE — 96372 THER/PROPH/DIAG INJ SC/IM: CPT | Mod: S$GLB,,, | Performed by: OBSTETRICS & GYNECOLOGY

## 2022-05-30 PROCEDURE — 96372 PR INJECTION,THERAP/PROPH/DIAG2ST, IM OR SUBCUT: ICD-10-PCS | Mod: S$GLB,,, | Performed by: OBSTETRICS & GYNECOLOGY

## 2022-05-30 RX ORDER — ESTRADIOL VALERATE 40 MG/ML
40 INJECTION INTRAMUSCULAR
Status: COMPLETED | OUTPATIENT
Start: 2022-05-30 | End: 2022-05-30

## 2022-05-30 RX ADMIN — ESTRADIOL VALERATE 40 MG: 40 INJECTION INTRAMUSCULAR at 04:05

## 2022-05-30 NOTE — NURSING
The patient tolerated the shot well. 40mg were given. She was advised to wait in exam room for 15 minutes post injection

## 2022-07-01 ENCOUNTER — CLINICAL SUPPORT (OUTPATIENT)
Dept: OBSTETRICS AND GYNECOLOGY | Facility: CLINIC | Age: 67
End: 2022-07-01
Payer: MEDICARE

## 2022-07-01 DIAGNOSIS — N95.1 MENOPAUSE SYNDROME: Primary | ICD-10-CM

## 2022-07-01 PROCEDURE — 96372 THER/PROPH/DIAG INJ SC/IM: CPT | Mod: S$GLB,,, | Performed by: NURSE PRACTITIONER

## 2022-07-01 PROCEDURE — 96372 PR INJECTION,THERAP/PROPH/DIAG2ST, IM OR SUBCUT: ICD-10-PCS | Mod: S$GLB,,, | Performed by: NURSE PRACTITIONER

## 2022-07-01 RX ORDER — ESTRADIOL VALERATE 40 MG/ML
40 INJECTION INTRAMUSCULAR
Status: COMPLETED | OUTPATIENT
Start: 2022-07-01 | End: 2022-07-01

## 2022-07-01 RX ADMIN — ESTRADIOL VALERATE 40 MG: 40 INJECTION INTRAMUSCULAR at 09:07

## 2022-07-01 NOTE — PROGRESS NOTES
Pt came into office today for her Delestrogen injection. Allergies and medications reviewed prior to administration. Delestrogen 40mg/cc administered to Right Dorsogluteal. Pt tolerated well. Yoselyn Campbell

## 2022-07-29 ENCOUNTER — CLINICAL SUPPORT (OUTPATIENT)
Dept: OBSTETRICS AND GYNECOLOGY | Facility: CLINIC | Age: 67
End: 2022-07-29
Payer: MEDICARE

## 2022-07-29 DIAGNOSIS — N95.1 MENOPAUSAL SYNDROME: Primary | ICD-10-CM

## 2022-07-29 PROCEDURE — 96372 PR INJECTION,THERAP/PROPH/DIAG2ST, IM OR SUBCUT: ICD-10-PCS | Mod: S$GLB,,, | Performed by: NURSE PRACTITIONER

## 2022-07-29 PROCEDURE — 96372 THER/PROPH/DIAG INJ SC/IM: CPT | Mod: S$GLB,,, | Performed by: NURSE PRACTITIONER

## 2022-07-29 RX ORDER — ESTRADIOL VALERATE 40 MG/ML
40 INJECTION INTRAMUSCULAR
Status: COMPLETED | OUTPATIENT
Start: 2022-07-29 | End: 2022-07-29

## 2022-07-29 RX ADMIN — ESTRADIOL VALERATE 40 MG: 40 INJECTION INTRAMUSCULAR at 10:07

## 2022-07-29 NOTE — PROGRESS NOTES
Pt came into office today for her Delestrogen injection. Allergies and medications reviewed prior to administration. Delestrogen 40mg/ml administered to Left Dorsalgluteal. Pt tolerated well. Yoselyn Campbell

## 2022-08-29 ENCOUNTER — TELEPHONE (OUTPATIENT)
Dept: OBSTETRICS AND GYNECOLOGY | Facility: CLINIC | Age: 67
End: 2022-08-29
Payer: MEDICARE

## 2022-08-29 NOTE — TELEPHONE ENCOUNTER
----- Message from Karlie Cody sent at 8/29/2022  9:33 AM CDT -----  Contact: PT    Who Called: Erika      Does the patient know what this is regarding?: r/s nurse visit for 08/30/2022    Would the patient rather a call back or a response via [facility]? Callback    Best Call Back Number: [phone] (home)         Additional Information:

## 2022-08-30 ENCOUNTER — CLINICAL SUPPORT (OUTPATIENT)
Dept: OBSTETRICS AND GYNECOLOGY | Facility: CLINIC | Age: 67
End: 2022-08-30
Payer: MEDICARE

## 2022-08-30 DIAGNOSIS — N95.1 MENOPAUSAL SYNDROME: Primary | ICD-10-CM

## 2022-08-30 PROCEDURE — 96372 THER/PROPH/DIAG INJ SC/IM: CPT | Mod: S$GLB,,, | Performed by: NURSE PRACTITIONER

## 2022-08-30 PROCEDURE — 96372 PR INJECTION,THERAP/PROPH/DIAG2ST, IM OR SUBCUT: ICD-10-PCS | Mod: S$GLB,,, | Performed by: NURSE PRACTITIONER

## 2022-08-30 RX ORDER — ESTRADIOL VALERATE 40 MG/ML
40 INJECTION INTRAMUSCULAR
Status: DISCONTINUED | OUTPATIENT
Start: 2022-08-30 | End: 2022-10-03

## 2022-08-30 RX ADMIN — ESTRADIOL VALERATE 40 MG: 40 INJECTION INTRAMUSCULAR at 11:08

## 2022-09-12 ENCOUNTER — OFFICE VISIT (OUTPATIENT)
Dept: OBSTETRICS AND GYNECOLOGY | Facility: CLINIC | Age: 67
End: 2022-09-12
Payer: MEDICARE

## 2022-09-12 VITALS
BODY MASS INDEX: 27.44 KG/M2 | SYSTOLIC BLOOD PRESSURE: 116 MMHG | WEIGHT: 170 LBS | HEART RATE: 112 BPM | DIASTOLIC BLOOD PRESSURE: 71 MMHG

## 2022-09-12 DIAGNOSIS — Z12.31 ENCOUNTER FOR SCREENING MAMMOGRAM FOR MALIGNANT NEOPLASM OF BREAST: ICD-10-CM

## 2022-09-12 DIAGNOSIS — Z01.419 GYNECOLOGIC EXAM NORMAL: Primary | ICD-10-CM

## 2022-09-12 PROCEDURE — G0101 CA SCREEN;PELVIC/BREAST EXAM: HCPCS | Mod: S$GLB,,, | Performed by: NURSE PRACTITIONER

## 2022-09-12 PROCEDURE — G0101 PR CA SCREEN;PELVIC/BREAST EXAM: ICD-10-PCS | Mod: S$GLB,,, | Performed by: NURSE PRACTITIONER

## 2022-09-12 RX ORDER — TERBINAFINE HYDROCHLORIDE 250 MG/1
250 TABLET ORAL DAILY
COMMUNITY
Start: 2022-08-15 | End: 2023-09-14

## 2022-09-12 RX ORDER — DULAGLUTIDE 0.75 MG/.5ML
INJECTION, SOLUTION SUBCUTANEOUS
COMMUNITY
Start: 2022-06-16 | End: 2023-09-14

## 2022-09-12 RX ORDER — HYDROCODONE BITARTRATE AND ACETAMINOPHEN 7.5; 325 MG/1; MG/1
TABLET ORAL
COMMUNITY
Start: 2022-08-02 | End: 2023-09-14

## 2022-09-12 RX ORDER — AMITRIPTYLINE HYDROCHLORIDE 50 MG/1
50 TABLET, FILM COATED ORAL NIGHTLY
COMMUNITY
End: 2022-09-19 | Stop reason: SDUPTHER

## 2022-09-12 NOTE — PROGRESS NOTES
Subjective:       Patient ID: Erika Espinosa is a 66 y.o. female.    Chief Complaint:  Well Woman      History of Present Illness  HPI  Annual Exam-Postmenopausal  Patient presents for annual exam. The patient has no complaints today. GYN screening history: last pap: was normal and last mammogram: was normal. The patient is not taking hormone replacement therapy. Patient denies post-menopausal vaginal bleeding. The patient wears seatbelts: yes.     Outpatient Medications Marked as Taking for the 9/12/22 encounter (Office Visit) with Alice Blas NP   Medication Sig Dispense Refill    amitriptyline (ELAVIL) 50 MG tablet Take 50 mg by mouth every evening.      ezetimibe (ZETIA) 10 mg tablet       fenofibrate 160 MG Tab Take 160 mg by mouth once daily.  1    ferrous sulfate (FEOSOL) 325 mg (65 mg iron) Tab tablet Take 1 tablet by mouth once daily.      gabapentin (NEURONTIN) 600 MG tablet Take 600 mg by mouth 3 (three) times daily.      hydroCHLOROthiazide (HYDRODIURIL) 25 MG tablet       HYDROcodone-acetaminophen (NORCO) 7.5-325 mg per tablet TAKE 1 TABLET BY MOUTH EVERY 6 HOURS MAX OF 4 TABLETS IN 24 HOURS FOR PAIN      JARDIANCE 25 mg Tab Take 1 tablet by mouth once daily.      lisinopril 10 MG tablet Take 10 mg by mouth once daily.  5    metoprolol succinate (TOPROL-XL) 50 MG 24 hr tablet Take 50 mg by mouth once daily.  5    pantoprazole (PROTONIX) 40 MG tablet TAKE 1 TABLET BY MOUTH ONCE DAILY 1 2 HOUR PRIOR TO MORNING MEAL ON AN EMPTY STOMACH  11    simvastatin (ZOCOR) 80 MG tablet Take 80 mg by mouth nightly.      SITagliptan-metformin (JANUMET) 50-1,000 mg per tablet Take 1 tablet by mouth 2 (two) times daily with meals.      terbinafine HCL (LAMISIL) 250 mg tablet Take 250 mg by mouth once daily.      TRULICITY 0.75 mg/0.5 mL pen injector       [DISCONTINUED] amitriptyline (ELAVIL) 25 MG tablet Take 1 tablet (25 mg total) by mouth nightly as needed for Insomnia. 30 tablet 6     Current  Facility-Administered Medications for the 22 encounter (Office Visit) with Alice Blas NP   Medication Dose Route Frequency Provider Last Rate Last Admin    estradiol valerate injection 40 mg  40 mg Intramuscular Q30 Days Alice Blas NP   40 mg at 22 1100     Vitals:    22 1102   BP: 116/71   Pulse: (!) 112   Weight: 77.1 kg (170 lb)     Past Medical History:   Diagnosis Date    Atrophic vaginitis     Depression     Diabetes     Hiatal hernia     Hyperlipidemia     Hypertension     IC (interstitial cystitis)     Menopausal syndrome     Peripheral neuropathy     Vaginal pain      Past Surgical History:   Procedure Laterality Date    BACK SURGERY      BILATERAL SALPINGOOPHORECTOMY      bladder sling amp repair      DIAGNOSTIC LAPAROSCOPY      ESOPHAGEAL DILATION      NEURO STIMULATOR IMPLANT      TONSILLECTOMY AND ADENOIDECTOMY      TOTAL ABDOMINAL HYSTERECTOMY      TOTAL HIP ARTHROPLASTY      bilat         GYN & OB History  No LMP recorded. Patient has had a hysterectomy.   Date of Last Pap: No result found    OB History    Para Term  AB Living   2 2           SAB IAB Ectopic Multiple Live Births                  # Outcome Date GA Lbr Speedy/2nd Weight Sex Delivery Anes PTL Lv   2 Para      Vag-Spont      1 Para      Vag-Spont          Review of Systems  Review of Systems   Constitutional:  Negative for activity change, appetite change, chills, fatigue and fever.   HENT:  Negative for nasal congestion and tinnitus.    Eyes:  Negative for visual disturbance.   Respiratory:  Negative for cough and shortness of breath.    Cardiovascular:  Negative for chest pain and palpitations.   Gastrointestinal:  Negative for abdominal pain, bloating, blood in stool, constipation, nausea and vomiting.   Endocrine: Negative for diabetes, hair loss and hot flashes.   Genitourinary:  Negative for bladder incontinence, decreased libido, dysmenorrhea, dyspareunia, dysuria, flank pain, frequency,  genital sores, hematuria, hot flashes, menorrhagia, menstrual problem, pelvic pain, urgency, vaginal bleeding, vaginal discharge, vaginal pain, urinary incontinence, postcoital bleeding, postmenopausal bleeding, vaginal dryness and vaginal odor.   Musculoskeletal:  Negative for arthralgias, back pain, leg pain and myalgias.   Integumentary:  Negative for rash, acne, hair changes, mole/lesion, breast mass, nipple discharge, breast skin changes and breast tenderness.   Neurological:  Negative for vertigo, syncope, numbness and headaches.   Hematological:  Does not bruise/bleed easily.   Psychiatric/Behavioral:  Negative for depression and sleep disturbance. The patient is not nervous/anxious.    Breast: Negative for asymmetry, lump, mass, mastodynia, nipple discharge, skin changes and tenderness        Objective:    Physical Exam:   Constitutional: Vital signs are normal. She appears well-developed and well-nourished.    HENT:   Head: Normocephalic.   Nose: No epistaxis.    Eyes: Lids are normal.    Neck: Trachea normal.    Cardiovascular:  Normal rate, regular rhythm and normal heart sounds.             Pulmonary/Chest: Effort normal and breath sounds normal. Right breast exhibits no mass, no skin change, no tenderness and no swelling. Left breast exhibits no mass, no skin change, no tenderness and no swelling. Breasts are symmetrical.          Genitourinary:    Vagina, uterus and rectum normal.      Pelvic exam was performed with patient supine.   Labial bartholins normal.Cervix is normal. Right adnexum displays no mass and no tenderness. Left adnexum displays no mass and no tenderness. No erythema or  no vaginal discharge in the vagina.              Lymphadenopathy:     She has no cervical adenopathy.      Psychiatric: She has a normal mood and affect. Her speech is normal and behavior is normal. Judgment and thought content normal.        Assessment:        1. Gynecologic exam normal    2. Encounter for screening  mammogram for malignant neoplasm of breast                Plan:      Gynecologic exam normal    Encounter for screening mammogram for malignant neoplasm of breast  -     Mammo Digital Screening Bilat w/ Jose; Future; Expected date: 09/12/2022    Patient was counseled today on current ASCCP pap guidelines, the recommendation for yearly pelvic exams, healthy diet and exercise routines, annual mammograms and breast self awareness. She is to see her PCP for other health maintenance.     Follow up in about 1 year (around 9/12/2023).

## 2022-09-13 ENCOUNTER — OFFICE VISIT (OUTPATIENT)
Dept: UROLOGY | Facility: CLINIC | Age: 67
End: 2022-09-13
Payer: MEDICARE

## 2022-09-13 VITALS
BODY MASS INDEX: 27.32 KG/M2 | HEART RATE: 92 BPM | WEIGHT: 170 LBS | DIASTOLIC BLOOD PRESSURE: 76 MMHG | HEIGHT: 66 IN | RESPIRATION RATE: 16 BRPM | SYSTOLIC BLOOD PRESSURE: 129 MMHG

## 2022-09-13 DIAGNOSIS — N30.10 INTERSTITIAL CYSTITIS: Primary | ICD-10-CM

## 2022-09-13 PROCEDURE — 99213 OFFICE O/P EST LOW 20 MIN: CPT | Mod: S$GLB,,, | Performed by: NURSE PRACTITIONER

## 2022-09-13 PROCEDURE — 99213 PR OFFICE/OUTPT VISIT, EST, LEVL III, 20-29 MIN: ICD-10-PCS | Mod: S$GLB,,, | Performed by: NURSE PRACTITIONER

## 2022-09-13 NOTE — PROGRESS NOTES
Subjective:       Patient ID: Erika Espinosa is a 66 y.o. female.    Chief Complaint: Follow-up      HPI: 66 year old female, established patient, presents for yearly visit.  Patient has a history of IC.  She is maintained on an IC diet and Elavil 50 mg qhs.     Patient states she is doing well with no complaints.  Denies any significant flare ups.    Has had Botox in the past.  States she became septic after injections.    Denies any urinary complaints at this time.        Past Medical History:   Past Medical History:   Diagnosis Date    Atrophic vaginitis     Depression     Diabetes     Hiatal hernia     Hyperlipidemia     Hypertension     IC (interstitial cystitis)     Menopausal syndrome     Peripheral neuropathy     Vaginal pain        Past Surgical Historical:   Past Surgical History:   Procedure Laterality Date    BACK SURGERY      BILATERAL SALPINGOOPHORECTOMY      bladder sling amp repair      DIAGNOSTIC LAPAROSCOPY      ESOPHAGEAL DILATION      NEURO STIMULATOR IMPLANT      TONSILLECTOMY AND ADENOIDECTOMY      TOTAL ABDOMINAL HYSTERECTOMY      TOTAL HIP ARTHROPLASTY      bilat        Medications:   Medication List with Changes/Refills   Current Medications    AMITRIPTYLINE (ELAVIL) 50 MG TABLET    Take 50 mg by mouth every evening.    EZETIMIBE (ZETIA) 10 MG TABLET        FENOFIBRATE 160 MG TAB    Take 160 mg by mouth once daily.    FERROUS SULFATE (FEOSOL) 325 MG (65 MG IRON) TAB TABLET    Take 1 tablet by mouth once daily.    GABAPENTIN (NEURONTIN) 600 MG TABLET    Take 600 mg by mouth 3 (three) times daily.    HYDROCHLOROTHIAZIDE (HYDRODIURIL) 25 MG TABLET        HYDROCODONE-ACETAMINOPHEN (NORCO) 7.5-325 MG PER TABLET    TAKE 1 TABLET BY MOUTH EVERY 6 HOURS MAX OF 4 TABLETS IN 24 HOURS FOR PAIN    JARDIANCE 25 MG TAB    Take 1 tablet by mouth once daily.    LISINOPRIL 10 MG TABLET    Take 10 mg by mouth once daily.    METOPROLOL SUCCINATE (TOPROL-XL) 50 MG 24 HR TABLET    Take 50 mg by mouth once daily.     PANTOPRAZOLE (PROTONIX) 40 MG TABLET    TAKE 1 TABLET BY MOUTH ONCE DAILY 1 2 HOUR PRIOR TO MORNING MEAL ON AN EMPTY STOMACH    SIMVASTATIN (ZOCOR) 80 MG TABLET    Take 80 mg by mouth nightly.    SITAGLIPTAN-METFORMIN (JANUMET) 50-1,000 MG PER TABLET    Take 1 tablet by mouth 2 (two) times daily with meals.    TERBINAFINE HCL (LAMISIL) 250 MG TABLET    Take 250 mg by mouth once daily.    TRULICITY 0.75 MG/0.5 ML PEN INJECTOR            Past Social History:   Social History     Socioeconomic History    Marital status:    Tobacco Use    Smoking status: Never    Smokeless tobacco: Never   Substance and Sexual Activity    Alcohol use: Not Currently    Drug use: Yes     Comment: Pain Management    Sexual activity: Yes     Partners: Male     Birth control/protection: See Surgical Hx     Comment: Hysterectomy       Allergies: Review of patient's allergies indicates:  No Known Allergies     Family History:   Family History   Problem Relation Age of Onset    Prostate cancer Father 71    Hypertension Mother     Heart disease Mother     Breast cancer Paternal Aunt 60    Breast cancer Paternal Aunt     Breast cancer Paternal Aunt         Review of Systems:  Review of Systems   Constitutional:  Negative for activity change and appetite change.   HENT:  Negative for congestion and dental problem.    Respiratory:  Negative for chest tightness and shortness of breath.    Cardiovascular:  Negative for chest pain.   Gastrointestinal:  Negative for abdominal distention.   Genitourinary:  Negative for decreased urine volume, difficulty urinating, dyspareunia, dysuria, enuresis, flank pain, frequency, genital sores, hematuria, pelvic pain and urgency.   Musculoskeletal:  Negative for back pain and neck pain.   Allergic/Immunologic: Negative for immunocompromised state.   Neurological:  Negative for dizziness.   Hematological:  Negative for adenopathy.   Psychiatric/Behavioral:  Negative for agitation, behavioral problems and  confusion.      Physical Exam:  Physical Exam  Vitals and nursing note reviewed.   Constitutional:       Appearance: She is well-developed.   HENT:      Head: Normocephalic.   Cardiovascular:      Rate and Rhythm: Normal rate and regular rhythm.      Heart sounds: Normal heart sounds.   Pulmonary:      Effort: Pulmonary effort is normal.      Breath sounds: Normal breath sounds.   Abdominal:      General: Bowel sounds are normal.      Palpations: Abdomen is soft.   Skin:     General: Skin is warm and dry.   Neurological:      Mental Status: She is alert and oriented to person, place, and time.     UA: glucose >1000, otherwise normal.    Assessment/Plan:   IC:   patient continues to do well with IC diet and Elavil 50 mg qhs.  She will continue both as directed.    Follow up one year, sooner if needed.   Problem List Items Addressed This Visit    None  Visit Diagnoses       Interstitial cystitis    -  Primary    Relevant Orders    POCT Urinalysis (w/[employer])

## 2022-09-19 ENCOUNTER — PATIENT MESSAGE (OUTPATIENT)
Dept: UROLOGY | Facility: CLINIC | Age: 67
End: 2022-09-19
Payer: MEDICARE

## 2022-09-19 RX ORDER — AMITRIPTYLINE HYDROCHLORIDE 50 MG/1
50 TABLET, FILM COATED ORAL NIGHTLY
Qty: 30 TABLET | Refills: 11 | Status: SHIPPED | OUTPATIENT
Start: 2022-09-19 | End: 2023-08-31 | Stop reason: SDUPTHER

## 2022-10-03 RX ORDER — ESTRADIOL 2 MG/1
2 TABLET ORAL DAILY
Qty: 30 TABLET | Refills: 11 | Status: SHIPPED | OUTPATIENT
Start: 2022-10-03 | End: 2023-09-07 | Stop reason: SDUPTHER

## 2022-10-03 NOTE — TELEPHONE ENCOUNTER
Pt is requesting to restart Estradiol Pill. Her spouse has a new job and they currently have 1 vehicle. Please send to pharmacy. Thanks!  Yoselyn Campbell

## 2022-10-07 ENCOUNTER — PATIENT MESSAGE (OUTPATIENT)
Dept: OBSTETRICS AND GYNECOLOGY | Facility: CLINIC | Age: 67
End: 2022-10-07
Payer: MEDICARE

## 2022-11-18 ENCOUNTER — PATIENT MESSAGE (OUTPATIENT)
Dept: OBSTETRICS AND GYNECOLOGY | Facility: CLINIC | Age: 67
End: 2022-11-18
Payer: MEDICARE

## 2022-11-18 DIAGNOSIS — N64.89 OTHER SPECIFIED DISORDERS OF BREAST: Primary | ICD-10-CM

## 2022-11-30 ENCOUNTER — PATIENT MESSAGE (OUTPATIENT)
Dept: OBSTETRICS AND GYNECOLOGY | Facility: CLINIC | Age: 67
End: 2022-11-30
Payer: MEDICARE

## 2023-01-05 ENCOUNTER — PATIENT MESSAGE (OUTPATIENT)
Dept: OBSTETRICS AND GYNECOLOGY | Facility: CLINIC | Age: 68
End: 2023-01-05
Payer: MEDICARE

## 2023-01-06 ENCOUNTER — OFFICE VISIT (OUTPATIENT)
Dept: OBSTETRICS AND GYNECOLOGY | Facility: CLINIC | Age: 68
End: 2023-01-06
Payer: MEDICARE

## 2023-01-06 VITALS
DIASTOLIC BLOOD PRESSURE: 72 MMHG | BODY MASS INDEX: 27.32 KG/M2 | SYSTOLIC BLOOD PRESSURE: 142 MMHG | WEIGHT: 170 LBS | HEIGHT: 66 IN | HEART RATE: 72 BPM

## 2023-01-06 DIAGNOSIS — N60.12 FIBROCYSTIC BREAST CHANGES, LEFT: Primary | ICD-10-CM

## 2023-01-06 PROCEDURE — 99212 OFFICE O/P EST SF 10 MIN: CPT | Mod: S$GLB,,, | Performed by: NURSE PRACTITIONER

## 2023-01-06 PROCEDURE — 99212 PR OFFICE/OUTPT VISIT, EST, LEVL II, 10-19 MIN: ICD-10-PCS | Mod: S$GLB,,, | Performed by: NURSE PRACTITIONER

## 2023-01-06 RX ORDER — VALSARTAN 80 MG/1
80 TABLET ORAL NIGHTLY
COMMUNITY
Start: 2022-12-13

## 2023-01-06 RX ORDER — ROPINIROLE 2 MG/1
TABLET, FILM COATED ORAL
COMMUNITY
Start: 2022-12-13

## 2023-01-06 NOTE — PROGRESS NOTES
Subjective:       Patient ID: Erika Espinosa is a 67 y.o. female.    Chief Complaint:  Breast Check (Pt noticed a knot on the bottom of left breast)      History of Present Illness  HPI  Had an abn mammo (cysts) and US in nov. Most of the cysts were in the upper breast area. She has noticed a new breast lump on the lower portion of her breast and would like it checked out     Outpatient Medications Marked as Taking for the 1/6/23 encounter (Office Visit) with Alice Blas NP   Medication Sig Dispense Refill    amitriptyline (ELAVIL) 50 MG tablet Take 1 tablet (50 mg total) by mouth every evening. 30 tablet 11    estradioL (ESTRACE) 2 MG tablet Take 1 tablet (2 mg total) by mouth once daily. 30 tablet 11    ezetimibe (ZETIA) 10 mg tablet       fenofibrate 160 MG Tab Take 160 mg by mouth once daily.  1    ferrous sulfate (FEOSOL) 325 mg (65 mg iron) Tab tablet Take 1 tablet by mouth once daily.      gabapentin (NEURONTIN) 600 MG tablet Take 600 mg by mouth 3 (three) times daily.      hydroCHLOROthiazide (HYDRODIURIL) 25 MG tablet       HYDROcodone-acetaminophen (NORCO) 7.5-325 mg per tablet TAKE 1 TABLET BY MOUTH EVERY 6 HOURS MAX OF 4 TABLETS IN 24 HOURS FOR PAIN      JARDIANCE 25 mg Tab Take 1 tablet by mouth once daily.      lisinopril 10 MG tablet Take 10 mg by mouth once daily.  5    metoprolol succinate (TOPROL-XL) 50 MG 24 hr tablet Take 50 mg by mouth once daily.  5    pantoprazole (PROTONIX) 40 MG tablet TAKE 1 TABLET BY MOUTH ONCE DAILY 1 2 HOUR PRIOR TO MORNING MEAL ON AN EMPTY STOMACH  11    rOPINIRole (REQUIP) 2 MG tablet TAKE 2 TABLETS BY MOUTH ONCE DAILY AT NIGHT      simvastatin (ZOCOR) 80 MG tablet Take 80 mg by mouth nightly.      SITagliptan-metformin (JANUMET) 50-1,000 mg per tablet Take 1 tablet by mouth 2 (two) times daily with meals.      terbinafine HCL (LAMISIL) 250 mg tablet Take 250 mg by mouth once daily.      TRULICITY 0.75 mg/0.5 mL pen injector       valsartan (DIOVAN) 80 MG tablet  "Take 80 mg by mouth every evening.       Vitals:    23 0808   BP: (!) 142/72   Pulse: 72   Weight: 77.1 kg (170 lb)   Height: 5' 6" (1.676 m)     Past Medical History:   Diagnosis Date    Atrophic vaginitis     Depression     Diabetes     Hiatal hernia     Hyperlipidemia     Hypertension     IC (interstitial cystitis)     Menopausal syndrome     Peripheral neuropathy     Urinary incontinence For years    Vaginal pain      Past Surgical History:   Procedure Laterality Date    ABDOMINAL SURGERY      Laporouscapy    BACK SURGERY      BILATERAL SALPINGOOPHORECTOMY      bladder sling amp repair      BLADDER SUSPENSION      Bladder sling    COLPOSCOPY      DIAGNOSTIC LAPAROSCOPY      ESOPHAGEAL DILATION      NEURO STIMULATOR IMPLANT      PELVIC LAPAROSCOPY      TONSILLECTOMY AND ADENOIDECTOMY      TOTAL ABDOMINAL HYSTERECTOMY      TOTAL HIP ARTHROPLASTY      bilat    TUBAL LIGATION         GYN & OB History  No LMP recorded. Patient has had a hysterectomy.   Date of Last Pap: No result found    OB History    Para Term  AB Living   2 2           SAB IAB Ectopic Multiple Live Births                  # Outcome Date GA Lbr Speedy/2nd Weight Sex Delivery Anes PTL Lv   2 Para      Vag-Spont      1 Para      Vag-Spont          Review of Systems  Review of Systems   Constitutional:  Negative for activity change, appetite change, chills, fatigue and fever.   HENT:  Negative for nasal congestion and tinnitus.    Eyes:  Negative for visual disturbance.   Respiratory:  Negative for cough and shortness of breath.    Cardiovascular:  Negative for chest pain and palpitations.   Gastrointestinal:  Negative for abdominal pain, bloating, blood in stool, constipation, nausea and vomiting.   Endocrine: Negative for diabetes, hair loss and hot flashes.   Genitourinary:  Negative for bladder incontinence, decreased libido, dysmenorrhea, dyspareunia, dysuria, flank pain, frequency, genital sores, hematuria, hot " flashes, menorrhagia, menstrual problem, pelvic pain, urgency, vaginal bleeding, vaginal discharge, vaginal pain, urinary incontinence, postcoital bleeding, postmenopausal bleeding, vaginal dryness and vaginal odor.   Musculoskeletal:  Negative for arthralgias, back pain, leg pain and myalgias.   Integumentary:  Negative for rash, acne, hair changes, mole/lesion, breast mass, nipple discharge, breast skin changes and breast tenderness.   Neurological:  Negative for vertigo, syncope, numbness and headaches.   Hematological:  Does not bruise/bleed easily.   Psychiatric/Behavioral:  Negative for depression and sleep disturbance. The patient is not nervous/anxious.    Breast: Positive for lump.Negative for asymmetry, mass, mastodynia, nipple discharge, skin changes and tenderness        Objective:    Physical Exam:    HENT:   Nose: No epistaxis.       Pulmonary/Chest: Left breast exhibits lumpectomy (fibrocystic breast changes).                                 Assessment:        1. Fibrocystic breast changes, left                Plan:      Fibrocystic breast changes, left  -     US Breast Left Complete; Future; Expected date: 01/06/2023      Follow up if symptoms worsen or fail to improve.

## 2023-01-11 ENCOUNTER — PATIENT MESSAGE (OUTPATIENT)
Dept: OBSTETRICS AND GYNECOLOGY | Facility: CLINIC | Age: 68
End: 2023-01-11
Payer: MEDICARE

## 2023-02-02 ENCOUNTER — PATIENT MESSAGE (OUTPATIENT)
Dept: UROLOGY | Facility: CLINIC | Age: 68
End: 2023-02-02
Payer: MEDICARE

## 2023-04-03 ENCOUNTER — PATIENT MESSAGE (OUTPATIENT)
Dept: UROLOGY | Facility: CLINIC | Age: 68
End: 2023-04-03
Payer: MEDICARE

## 2023-04-04 ENCOUNTER — CLINICAL SUPPORT (OUTPATIENT)
Dept: UROLOGY | Facility: CLINIC | Age: 68
End: 2023-04-04
Payer: MEDICARE

## 2023-04-04 DIAGNOSIS — R82.90 ABNORMAL URINALYSIS: ICD-10-CM

## 2023-04-04 DIAGNOSIS — R30.0 DYSURIA: Primary | ICD-10-CM

## 2023-04-04 RX ORDER — NITROFURANTOIN 25; 75 MG/1; MG/1
100 CAPSULE ORAL 2 TIMES DAILY
Qty: 14 CAPSULE | Refills: 0 | Status: SHIPPED | OUTPATIENT
Start: 2023-04-04 | End: 2023-04-11

## 2023-04-07 LAB — URINE CULTURE, ROUTINE: NORMAL

## 2023-04-10 ENCOUNTER — TELEPHONE (OUTPATIENT)
Dept: UROLOGY | Facility: CLINIC | Age: 68
End: 2023-04-10
Payer: MEDICARE

## 2023-04-10 RX ORDER — AMOXICILLIN AND CLAVULANATE POTASSIUM 875; 125 MG/1; MG/1
1 TABLET, FILM COATED ORAL 2 TIMES DAILY
Qty: 14 TABLET | Refills: 0 | Status: SHIPPED | OUTPATIENT
Start: 2023-04-10 | End: 2023-04-17

## 2023-04-10 NOTE — TELEPHONE ENCOUNTER
----- Message from Juwan Holland NP sent at 4/10/2023  8:55 AM CDT -----  Klebsiella on urine culture.    Intermediate response to Macrobid.    Augmentin sent to pharmacy.

## 2023-09-05 RX ORDER — AMITRIPTYLINE HYDROCHLORIDE 50 MG/1
50 TABLET, FILM COATED ORAL NIGHTLY
Qty: 30 TABLET | Refills: 0 | Status: SHIPPED | OUTPATIENT
Start: 2023-09-05 | End: 2023-09-14 | Stop reason: SDUPTHER

## 2023-09-07 DIAGNOSIS — N95.1 MENOPAUSE SYNDROME: Primary | ICD-10-CM

## 2023-09-07 RX ORDER — ESTRADIOL 2 MG/1
2 TABLET ORAL DAILY
Qty: 30 TABLET | Refills: 4 | Status: SHIPPED | OUTPATIENT
Start: 2023-09-07 | End: 2024-01-29 | Stop reason: SDUPTHER

## 2023-09-07 NOTE — TELEPHONE ENCOUNTER
Phone call returned. Patient is scheduled to see Kristine in December.  She needs  refills of Estrace 2 mg through 12/2023

## 2023-09-07 NOTE — TELEPHONE ENCOUNTER
----- Message from Jessie Coburn sent at 9/7/2023  2:04 PM CDT -----  Contact: Pt  Type:  RX Refill Request    Who Called: pt   Refill or New Rx: refill   RX Name and Strength: estradioL (ESTRACE) 2 MG tablet  How is the patient currently taking it? (ex. 1XDay): once daily  Is this a 30 day or 90 day RX: 30 days   [facility] with phone number[facility]  Local or Mail Order: local   Ordering Provider: Roopa   Would the patient rather a call back or a response via [facility]? Phone & portal  Best Call Back Number: [phone]   Additional Information:  pt has an appt coming up with Ms Ramos/ was scheduled with Ms Blas but was booked out        [facility] [address] [place] [address] [address] [place] [facility] [place] [address]  Phone: [phone] Fax: [phone]

## 2023-09-14 ENCOUNTER — OFFICE VISIT (OUTPATIENT)
Dept: UROLOGY | Facility: CLINIC | Age: 68
End: 2023-09-14
Payer: MEDICARE

## 2023-09-14 DIAGNOSIS — N30.10 INTERSTITIAL CYSTITIS: Primary | ICD-10-CM

## 2023-09-14 LAB
BILIRUBIN, UA POC OHS: NEGATIVE
BLOOD, UA POC OHS: NEGATIVE
CLARITY, UA POC OHS: CLEAR
COLOR, UA POC OHS: YELLOW
GLUCOSE, UA POC OHS: >=1000
KETONES, UA POC OHS: NEGATIVE
LEUKOCYTES, UA POC OHS: NEGATIVE
NITRITE, UA POC OHS: NEGATIVE
PH, UA POC OHS: 5
PROTEIN, UA POC OHS: NEGATIVE
SPECIFIC GRAVITY, UA POC OHS: 1.01
UROBILINOGEN, UA POC OHS: 0.2

## 2023-09-14 PROCEDURE — 99213 OFFICE O/P EST LOW 20 MIN: CPT | Mod: S$GLB,,, | Performed by: NURSE PRACTITIONER

## 2023-09-14 PROCEDURE — 81003 POCT URINALYSIS(INSTRUMENT): ICD-10-PCS | Mod: QW,S$GLB,, | Performed by: NURSE PRACTITIONER

## 2023-09-14 PROCEDURE — 99213 PR OFFICE/OUTPT VISIT, EST, LEVL III, 20-29 MIN: ICD-10-PCS | Mod: S$GLB,,, | Performed by: NURSE PRACTITIONER

## 2023-09-14 PROCEDURE — 81003 URINALYSIS AUTO W/O SCOPE: CPT | Mod: QW,S$GLB,, | Performed by: NURSE PRACTITIONER

## 2023-09-14 RX ORDER — DULAGLUTIDE 4.5 MG/.5ML
INJECTION, SOLUTION SUBCUTANEOUS
COMMUNITY
Start: 2023-08-22

## 2023-09-14 RX ORDER — TRAZODONE HYDROCHLORIDE 50 MG/1
TABLET ORAL
COMMUNITY
Start: 2023-08-09

## 2023-09-14 RX ORDER — AMITRIPTYLINE HYDROCHLORIDE 50 MG/1
50 TABLET, FILM COATED ORAL NIGHTLY
Qty: 30 TABLET | Refills: 11 | Status: SHIPPED | OUTPATIENT
Start: 2023-09-14 | End: 2024-09-13

## 2023-09-14 NOTE — PROGRESS NOTES
Subjective:       Patient ID: Erika Espinosa is a 67 y.o. female.    Chief Complaint: Annual Exam (Hx of IC)      HPI:  67-year-old female, established patient, presents for yearly visit.    Patient has history of interstitial cystitis.  She is maintained on Elavil 50 mg daily and follows an IC diet.    Patient states he has been doing well for the most part.  She has not had any significant flare ups.  She did have a UTI back in April.    At this time she denies any pain or burning urination.  Denies any odor urine.  Denies any fever or body aches.  Denies any bladder pain or bladder spasms.    No other urinary complaints at this time.         Past Medical History:   Past Medical History:   Diagnosis Date    Atrophic vaginitis     Depression     Diabetes     Hiatal hernia     Hyperlipidemia     Hypertension     IC (interstitial cystitis)     Menopausal syndrome     Peripheral neuropathy     Urinary incontinence For years    Vaginal pain        Past Surgical Historical:   Past Surgical History:   Procedure Laterality Date    ABDOMINAL SURGERY      Laporouscapy    BACK SURGERY      BILATERAL SALPINGOOPHORECTOMY      bladder sling amp repair      BLADDER SUSPENSION  2012    Bladder sling    COLPOSCOPY  1/22    DIAGNOSTIC LAPAROSCOPY      ESOPHAGEAL DILATION      NEURO STIMULATOR IMPLANT      PELVIC LAPAROSCOPY      TONSILLECTOMY AND ADENOIDECTOMY      TOTAL ABDOMINAL HYSTERECTOMY      TOTAL HIP ARTHROPLASTY      bilat    TUBAL LIGATION  1983        Medications:   Medication List with Changes/Refills   Current Medications    DULAGLUTIDE (TRULICITY) 4.5 MG/0.5 ML PEN INJECTOR        ESTRADIOL (ESTRACE) 2 MG TABLET    Take 1 tablet (2 mg total) by mouth once daily.    EZETIMIBE (ZETIA) 10 MG TABLET        FENOFIBRATE 160 MG TAB    Take 160 mg by mouth once daily.    FERROUS SULFATE (FEOSOL) 325 MG (65 MG IRON) TAB TABLET    Take 1 tablet by mouth once daily.    GABAPENTIN (NEURONTIN) 600 MG TABLET    Take 600 mg by mouth 3  (three) times daily.    HYDROCHLOROTHIAZIDE (HYDRODIURIL) 25 MG TABLET        HYDROCODONE-ACETAMINOPHEN (NORCO) 7.5-325 MG PER TABLET    TAKE 1 TABLET BY MOUTH EVERY 6 HOURS MAX OF 4 TABLETS IN 24 HOURS FOR PAIN    JARDIANCE 25 MG TAB    Take 1 tablet by mouth once daily.    LISINOPRIL 10 MG TABLET    Take 10 mg by mouth once daily.    METOPROLOL SUCCINATE (TOPROL-XL) 50 MG 24 HR TABLET    Take 50 mg by mouth once daily.    PANTOPRAZOLE (PROTONIX) 40 MG TABLET    TAKE 1 TABLET BY MOUTH ONCE DAILY 1 2 HOUR PRIOR TO MORNING MEAL ON AN EMPTY STOMACH    ROPINIROLE (REQUIP) 2 MG TABLET    TAKE 2 TABLETS BY MOUTH ONCE DAILY AT NIGHT    SIMVASTATIN (ZOCOR) 80 MG TABLET    Take 80 mg by mouth nightly.    SITAGLIPTAN-METFORMIN (JANUMET) 50-1,000 MG PER TABLET    Take 1 tablet by mouth 2 (two) times daily with meals.    TERBINAFINE HCL (LAMISIL) 250 MG TABLET    Take 250 mg by mouth once daily.    TRAZODONE (DESYREL) 50 MG TABLET    TAKE 1/2 (ONE-HALF) TABLET BY MOUTH AT BEDTIME FOR 7 DAYS, THEN START TAKING 1 TABLET    TRULICITY 0.75 MG/0.5 ML PEN INJECTOR        VALSARTAN (DIOVAN) 80 MG TABLET    Take 80 mg by mouth every evening.   Changed and/or Refilled Medications    Modified Medication Previous Medication    AMITRIPTYLINE (ELAVIL) 50 MG TABLET amitriptyline (ELAVIL) 50 MG tablet       Take 1 tablet (50 mg total) by mouth every evening.    Take 1 tablet (50 mg total) by mouth every evening.        Past Social History:   Social History     Socioeconomic History    Marital status:    Tobacco Use    Smoking status: Never    Smokeless tobacco: Never   Substance and Sexual Activity    Alcohol use: Not Currently    Drug use: Yes     Comment: Pain Management    Sexual activity: Yes     Partners: Male     Birth control/protection: See Surgical Hx     Comment: Hysterectomy       Allergies: Review of patient's allergies indicates:  No Known Allergies     Family History:   Family History   Problem Relation Age of Onset     Prostate cancer Father 71    Cancer Father     Hypertension Mother     Heart disease Mother     Asthma Mother     Heart failure Mother     Breast cancer Paternal Aunt 60    Breast cancer Paternal Aunt     Breast cancer Paternal Aunt     Diabetes Sister     Diabetes Sister         Review of Systems:  Review of Systems   Constitutional:  Negative for activity change and appetite change.   HENT:  Negative for congestion and dental problem.    Respiratory:  Negative for chest tightness and shortness of breath.    Cardiovascular:  Negative for chest pain.   Gastrointestinal:  Negative for abdominal distention.   Genitourinary:  Negative for decreased urine volume, difficulty urinating, dyspareunia, dysuria, enuresis, flank pain, frequency, genital sores, hematuria, pelvic pain and urgency.   Musculoskeletal:  Negative for back pain and neck pain.   Allergic/Immunologic: Negative for immunocompromised state.   Neurological:  Negative for dizziness.   Hematological:  Negative for adenopathy.   Psychiatric/Behavioral:  Negative for agitation, behavioral problems and confusion.        Physical Exam:  Physical Exam  Vitals and nursing note reviewed.   Constitutional:       Appearance: She is well-developed.   HENT:      Head: Normocephalic.   Cardiovascular:      Rate and Rhythm: Normal rate and regular rhythm.      Heart sounds: Normal heart sounds.   Pulmonary:      Effort: Pulmonary effort is normal.      Breath sounds: Normal breath sounds.   Abdominal:      General: Bowel sounds are normal.      Palpations: Abdomen is soft.   Skin:     General: Skin is warm and dry.   Neurological:      Mental Status: She is alert and oriented to person, place, and time.         Urinalysis:  Glucose greater than 1000.  Otherwise normal.    Assessment/Plan:     Interstitial cystitis:  Patient is doing well on Elavil 50 mg daily and with IC diet.    Patient will notify us for any flare ups.    Patient educated on drop off urine.       Follow-up 1 year, sooner if needed.  Problem List Items Addressed This Visit    None  Visit Diagnoses       Interstitial cystitis    -  Primary    Relevant Medications    amitriptyline (ELAVIL) 50 MG tablet    Other Relevant Orders    POCT Urinalysis(Instrument)

## 2023-10-04 ENCOUNTER — PATIENT MESSAGE (OUTPATIENT)
Dept: OBSTETRICS AND GYNECOLOGY | Facility: CLINIC | Age: 68
End: 2023-10-04
Payer: MEDICARE

## 2023-10-04 DIAGNOSIS — B37.31 VAGINAL YEAST INFECTION: Primary | ICD-10-CM

## 2023-10-04 RX ORDER — FLUCONAZOLE 150 MG/1
150 TABLET ORAL EVERY OTHER DAY
Qty: 2 TABLET | Refills: 0 | Status: SHIPPED | OUTPATIENT
Start: 2023-10-04 | End: 2023-10-06 | Stop reason: SDUPTHER

## 2023-10-04 NOTE — TELEPHONE ENCOUNTER
Pt requesting Diflucan for vaginal yeast infection.Pt was advised that if symptoms don't resolve she will need to come in for a visit.

## 2023-10-06 ENCOUNTER — PATIENT MESSAGE (OUTPATIENT)
Dept: OBSTETRICS AND GYNECOLOGY | Facility: CLINIC | Age: 68
End: 2023-10-06
Payer: MEDICARE

## 2023-10-06 DIAGNOSIS — B37.31 VAGINAL YEAST INFECTION: ICD-10-CM

## 2023-10-06 RX ORDER — FLUCONAZOLE 150 MG/1
150 TABLET ORAL EVERY OTHER DAY
Qty: 2 TABLET | Refills: 0 | Status: SHIPPED | OUTPATIENT
Start: 2023-10-06 | End: 2023-10-09

## 2023-12-05 ENCOUNTER — OFFICE VISIT (OUTPATIENT)
Dept: OBSTETRICS AND GYNECOLOGY | Facility: CLINIC | Age: 68
End: 2023-12-05
Payer: MEDICARE

## 2023-12-05 VITALS
BODY MASS INDEX: 25.76 KG/M2 | SYSTOLIC BLOOD PRESSURE: 113 MMHG | WEIGHT: 159.63 LBS | HEART RATE: 88 BPM | DIASTOLIC BLOOD PRESSURE: 72 MMHG

## 2023-12-05 DIAGNOSIS — Z78.0 MENOPAUSE: ICD-10-CM

## 2023-12-05 DIAGNOSIS — B37.31 YEAST INFECTION OF THE VAGINA: Primary | ICD-10-CM

## 2023-12-05 DIAGNOSIS — Z12.31 ENCOUNTER FOR SCREENING MAMMOGRAM FOR MALIGNANT NEOPLASM OF BREAST: ICD-10-CM

## 2023-12-05 DIAGNOSIS — R30.0 DYSURIA: ICD-10-CM

## 2023-12-05 DIAGNOSIS — Z01.419 GYNECOLOGIC EXAM NORMAL: ICD-10-CM

## 2023-12-05 PROCEDURE — 99214 OFFICE O/P EST MOD 30 MIN: CPT | Mod: S$GLB,,, | Performed by: NURSE PRACTITIONER

## 2023-12-05 PROCEDURE — 99214 PR OFFICE/OUTPT VISIT, EST, LEVL IV, 30-39 MIN: ICD-10-PCS | Mod: S$GLB,,, | Performed by: NURSE PRACTITIONER

## 2023-12-05 RX ORDER — ROPINIROLE 4 MG/1
TABLET, FILM COATED ORAL
COMMUNITY
Start: 2023-08-11

## 2023-12-05 NOTE — PROGRESS NOTES
Subjective:      Patient ID: Erika Espinosa is a 68 y.o. female.    Chief Complaint: Pelvic Pain (She's been having pelvic pain on and off for a couple of months. Now the past week it has been painful all the time.), and Vaginal irritation (Pt states that her vagina is irritated. No discharge with it.)    History of Present Illness  Pelvic Pain  The patient's primary symptoms include pelvic pain. The patient's pertinent negatives include no vaginal discharge. Associated symptoms include dysuria. Pertinent negatives include no abdominal pain, back pain, chills, constipation, fever, flank pain, frequency, headaches, hematuria, nausea, rash, urgency or vomiting. There is no history of menorrhagia.     Reports that she has had some bladder/pelvic pain and vaginal pain, external and internal. She has had diflucan and lotrisone wo relief  last A1C was 6.4    Outpatient Medications Marked as Taking for the 12/5/23 encounter (Office Visit) with Alice Blas NP   Medication Sig Dispense Refill    amitriptyline (ELAVIL) 50 MG tablet Take 1 tablet (50 mg total) by mouth every evening. 30 tablet 11    dulaglutide (TRULICITY) 4.5 mg/0.5 mL pen injector       estradioL (ESTRACE) 2 MG tablet Take 1 tablet (2 mg total) by mouth once daily. 30 tablet 4    ezetimibe (ZETIA) 10 mg tablet       fenofibrate 160 MG Tab Take 160 mg by mouth once daily.  1    ferrous sulfate (FEOSOL) 325 mg (65 mg iron) Tab tablet Take 1 tablet by mouth once daily.      gabapentin (NEURONTIN) 600 MG tablet Take 600 mg by mouth 3 (three) times daily.      hydroCHLOROthiazide (HYDRODIURIL) 25 MG tablet       JARDIANCE 25 mg Tab Take 1 tablet by mouth once daily.      metoprolol succinate (TOPROL-XL) 50 MG 24 hr tablet Take 50 mg by mouth once daily.  5    pantoprazole (PROTONIX) 40 MG tablet TAKE 1 TABLET BY MOUTH ONCE DAILY 1 2 HOUR PRIOR TO MORNING MEAL ON AN EMPTY STOMACH  11    rOPINIRole (REQUIP) 2 MG tablet TAKE 2 TABLETS BY MOUTH ONCE DAILY AT  NIGHT      rOPINIRole (REQUIP) 4 MG tablet TAKE 2 TABLETS BY MOUTH ONCE DAILY AT NIGHT      simvastatin (ZOCOR) 80 MG tablet Take 80 mg by mouth nightly.      SITagliptan-metformin (JANUMET) 50-1,000 mg per tablet Take 1 tablet by mouth 2 (two) times daily with meals.      traZODone (DESYREL) 50 MG tablet TAKE 1/2 (ONE-HALF) TABLET BY MOUTH AT BEDTIME FOR 7 DAYS, THEN START TAKING 1 TABLET      valsartan (DIOVAN) 80 MG tablet Take 80 mg by mouth every evening.       Vitals:    23 1415   BP: 113/72   Pulse: 88   Weight: 72.4 kg (159 lb 9.6 oz)     Past Medical History:   Diagnosis Date    Atrophic vaginitis     Depression     Diabetes     Hiatal hernia     Hyperlipidemia     Hypertension     IC (interstitial cystitis)     Menopausal syndrome     Peripheral neuropathy     Urinary incontinence For years    Vaginal pain      Past Surgical History:   Procedure Laterality Date    ABDOMINAL SURGERY      Laporouscapy    APPENDECTOMY      BACK SURGERY      BILATERAL SALPINGOOPHORECTOMY      bladder sling amp repair      BLADDER SUSPENSION      Bladder sling    COLPOSCOPY      DIAGNOSTIC LAPAROSCOPY      ESOPHAGEAL DILATION      NEURO STIMULATOR IMPLANT      PELVIC LAPAROSCOPY      TONSILLECTOMY AND ADENOIDECTOMY      TOTAL ABDOMINAL HYSTERECTOMY      TOTAL HIP ARTHROPLASTY      bilat    TUBAL LIGATION           GYN & OB History  No LMP recorded. Patient has had a hysterectomy.   Date of Last Pap: No result found    OB History    Para Term  AB Living   2 2           SAB IAB Ectopic Multiple Live Births                  # Outcome Date GA Lbr Speedy/2nd Weight Sex Delivery Anes PTL Lv   2 Para      Vag-Spont      1 Para      Vag-Spont          Review of Systems  Review of Systems   Constitutional:  Negative for activity change, appetite change, chills, fatigue and fever.   HENT:  Negative for nasal congestion and tinnitus.    Eyes:  Negative for visual disturbance.   Respiratory:  Negative for  cough and shortness of breath.    Cardiovascular:  Negative for chest pain and palpitations.   Gastrointestinal:  Negative for abdominal pain, bloating, blood in stool, constipation, nausea and vomiting.   Endocrine: Negative for diabetes, hair loss and hot flashes.   Genitourinary:  Positive for dysuria, pelvic pain and vaginal pain. Negative for bladder incontinence, decreased libido, dysmenorrhea, dyspareunia, flank pain, frequency, genital sores, hematuria, hot flashes, menorrhagia, menstrual problem, urgency, vaginal bleeding, vaginal discharge, urinary incontinence, postcoital bleeding, postmenopausal bleeding, vaginal dryness and vaginal odor.   Musculoskeletal:  Negative for arthralgias, back pain, leg pain and myalgias.   Integumentary:  Negative for rash, acne, hair changes, mole/lesion, breast mass, nipple discharge, breast skin changes and breast tenderness.   Neurological:  Negative for vertigo, syncope, numbness and headaches.   Hematological:  Does not bruise/bleed easily.   Psychiatric/Behavioral:  Negative for depression and sleep disturbance. The patient is not nervous/anxious.    Breast: Negative for asymmetry, lump, mass, mastodynia, nipple discharge, skin changes and tenderness         Objective:     Physical Exam:   Constitutional: She appears well-developed and well-nourished.    HENT:   Nose: No epistaxis.     Neck: No thyroid mass and no thyromegaly present.     Pulmonary/Chest: Chest wall is not dull to percussion. She exhibits no mass, no tenderness, no bony tenderness, no laceration, no crepitus, no edema, no deformity, no swelling and no retraction. Right breast exhibits no inverted nipple, no mass, no nipple discharge, no skin change, no tenderness, presence, no bleeding and no swelling. Left breast exhibits no inverted nipple, no mass, no nipple discharge, no skin change, no tenderness, presence, no bleeding and no swelling. Breasts are symmetrical.        Abdominal: Soft. Bowel sounds  are normal. She exhibits no distension. There is no abdominal tenderness. Hernia confirmed negative in the right inguinal area and confirmed negative in the left inguinal area.     Genitourinary:    Rectum normal.            Pelvic exam was performed with patient supine.   Labial bartholins normal.There is no rash, tenderness, lesion or injury on the right labia. There is no rash, tenderness, lesion or injury on the left labia. Right adnexum displays no mass, no tenderness and no fullness. Left adnexum displays no mass, no tenderness and no fullness. Vaginal cuff normal.  There is erythema in the vagina. No  no vaginal discharge, tenderness, bleeding, rectocele, cystocele or unspecified prolapse of vaginal walls in the vagina.    No foreign body in the vagina.      No signs of injury in the vagina.   Vaginal atrophy noted. Cervix is absent.Uterus is absent.                Skin: Skin is warm and dry.    Psychiatric: She has a normal mood and affect. Her speech is normal and behavior is normal.      Chaperone present        Assessment:     1. Yeast infection of the vagina    2. Gynecologic exam normal    3. Encounter for screening mammogram for malignant neoplasm of breast    4. Menopause               Plan:     Yeast infection of the vagina  -     butoconazole nitrate (GYNAZOLE-1) 2 % Crea; Place 1 applicator vaginally once. for 1 dose  Dispense: 5 g; Refill: 0  Keep the vaginal area clean and dry. Warm moist environments are a good breeding ground for yeast. Shower promptly after exercising. Wear cotton underwear and loose fitting clothes on the bottom. If you swim, try not to stay in a wet bathing suit too long. Decreasing carbs and sugar in the diet can also prevent yeast infections. Please refer to the education handout or call the office with concerns      Gynecologic exam normal  -     Liquid-based pap smear, screening; Future; Expected date: 12/05/2023    Encounter for screening mammogram for malignant  neoplasm of breast  -     Mammo Digital Screening Bilat w/ Jose; Future; Expected date: 12/05/2023    Menopause  -     DXA Bone Density Axial Skeleton 1 or more sites; Future; Expected date: 12/05/2023  The following methods for promoting bone health & decreasing the risk for osteoporosis were discussed with the patient:  Engaging in weight bearing exercises most days of the week  Calcium and vitamin D supplementation  Decreasing caffeine intake  Avoiding alcohol consumption  Smoking cessation, if necessary     Dysuria  -     Urinalysis, Reflex to Urine Culture Urine, Clean Catch    Follow up in about 1 year (around 12/5/2024).

## 2023-12-06 ENCOUNTER — PATIENT MESSAGE (OUTPATIENT)
Dept: OBSTETRICS AND GYNECOLOGY | Facility: CLINIC | Age: 68
End: 2023-12-06
Payer: MEDICARE

## 2023-12-06 RX ORDER — TERCONAZOLE 8 MG/G
1 CREAM VAGINAL NIGHTLY
Qty: 20 G | Refills: 0 | Status: SHIPPED | OUTPATIENT
Start: 2023-12-06 | End: 2023-12-13

## 2023-12-07 LAB — Lab: NORMAL

## 2023-12-08 ENCOUNTER — PATIENT MESSAGE (OUTPATIENT)
Dept: OBSTETRICS AND GYNECOLOGY | Facility: CLINIC | Age: 68
End: 2023-12-08
Payer: MEDICARE

## 2023-12-11 ENCOUNTER — PATIENT MESSAGE (OUTPATIENT)
Dept: OBSTETRICS AND GYNECOLOGY | Facility: CLINIC | Age: 68
End: 2023-12-11

## 2023-12-11 DIAGNOSIS — M81.0 OSTEOPOROSIS, UNSPECIFIED OSTEOPOROSIS TYPE, UNSPECIFIED PATHOLOGICAL FRACTURE PRESENCE: Primary | ICD-10-CM

## 2024-01-09 ENCOUNTER — PATIENT MESSAGE (OUTPATIENT)
Dept: OBSTETRICS AND GYNECOLOGY | Facility: CLINIC | Age: 69
End: 2024-01-09
Payer: MEDICARE

## 2024-01-29 DIAGNOSIS — N95.1 MENOPAUSE SYNDROME: ICD-10-CM

## 2024-01-30 RX ORDER — ESTRADIOL 2 MG/1
2 TABLET ORAL DAILY
Qty: 30 TABLET | Refills: 11 | Status: SHIPPED | OUTPATIENT
Start: 2024-01-30 | End: 2025-01-29

## 2024-02-08 ENCOUNTER — DOCUMENTATION ONLY (OUTPATIENT)
Dept: OBSTETRICS AND GYNECOLOGY | Facility: CLINIC | Age: 69
End: 2024-02-08
Payer: MEDICARE

## 2024-03-19 ENCOUNTER — PATIENT MESSAGE (OUTPATIENT)
Dept: OBSTETRICS AND GYNECOLOGY | Facility: CLINIC | Age: 69
End: 2024-03-19
Payer: MEDICARE

## 2024-03-19 RX ORDER — FLUCONAZOLE 150 MG/1
150 TABLET ORAL EVERY OTHER DAY
Qty: 2 TABLET | Refills: 0 | Status: SHIPPED | OUTPATIENT
Start: 2024-03-19 | End: 2024-03-22

## 2024-03-19 NOTE — TELEPHONE ENCOUNTER
Will you send out Diflucan for patient with vaginal irritation and itching? Or do you want her to come in for a visit this afternoon?

## 2024-07-26 ENCOUNTER — PATIENT MESSAGE (OUTPATIENT)
Dept: OBSTETRICS AND GYNECOLOGY | Facility: CLINIC | Age: 69
End: 2024-07-26
Payer: MEDICARE

## 2024-07-26 RX ORDER — FLUCONAZOLE 150 MG/1
150 TABLET ORAL EVERY OTHER DAY
Qty: 2 TABLET | Refills: 0 | Status: SHIPPED | OUTPATIENT
Start: 2024-07-26 | End: 2024-07-29

## 2024-09-12 ENCOUNTER — OFFICE VISIT (OUTPATIENT)
Dept: UROLOGY | Facility: CLINIC | Age: 69
End: 2024-09-12
Payer: MEDICARE

## 2024-09-12 VITALS
DIASTOLIC BLOOD PRESSURE: 73 MMHG | HEIGHT: 66 IN | BODY MASS INDEX: 26.52 KG/M2 | HEART RATE: 82 BPM | RESPIRATION RATE: 12 BRPM | SYSTOLIC BLOOD PRESSURE: 134 MMHG | WEIGHT: 165 LBS

## 2024-09-12 DIAGNOSIS — R39.15 URINARY URGENCY: ICD-10-CM

## 2024-09-12 DIAGNOSIS — R35.0 URINARY FREQUENCY: ICD-10-CM

## 2024-09-12 DIAGNOSIS — N39.41 URGE INCONTINENCE: ICD-10-CM

## 2024-09-12 DIAGNOSIS — N30.10 INTERSTITIAL CYSTITIS: Primary | ICD-10-CM

## 2024-09-12 LAB
BILIRUBIN, UA POC OHS: NEGATIVE
BLOOD, UA POC OHS: NEGATIVE
CLARITY, UA POC OHS: CLEAR
COLOR, UA POC OHS: YELLOW
GLUCOSE, UA POC OHS: >=1000
KETONES, UA POC OHS: NEGATIVE
LEUKOCYTES, UA POC OHS: NEGATIVE
NITRITE, UA POC OHS: NEGATIVE
PH, UA POC OHS: 5.5
PROTEIN, UA POC OHS: NEGATIVE
SPECIFIC GRAVITY, UA POC OHS: 1.02
UROBILINOGEN, UA POC OHS: 0.2

## 2024-09-12 PROCEDURE — 99214 OFFICE O/P EST MOD 30 MIN: CPT | Mod: S$GLB,,, | Performed by: NURSE PRACTITIONER

## 2024-09-12 PROCEDURE — 81003 URINALYSIS AUTO W/O SCOPE: CPT | Mod: QW,S$GLB,, | Performed by: NURSE PRACTITIONER

## 2024-09-12 RX ORDER — GABAPENTIN 800 MG/1
800 TABLET ORAL 3 TIMES DAILY
COMMUNITY
Start: 2024-08-23

## 2024-09-12 RX ORDER — AMITRIPTYLINE HYDROCHLORIDE 50 MG/1
50 TABLET, FILM COATED ORAL NIGHTLY
Qty: 30 TABLET | Refills: 11 | Status: SHIPPED | OUTPATIENT
Start: 2024-09-12 | End: 2025-09-12

## 2024-09-12 RX ORDER — METFORMIN HYDROCHLORIDE 1000 MG/1
1000 TABLET ORAL 2 TIMES DAILY
COMMUNITY
Start: 2024-07-08

## 2024-09-12 NOTE — PROGRESS NOTES
Subjective:       Patient ID: Erika Espinosa is a 68 y.o. female.    Chief Complaint: Follow-up (Yrly IC)      HPI: 68-year-old female, established patient, presents for yearly visit.    Patient has history of interstitial cystitis.  She is maintained on Elavil 50 mg daily and follows an IC diet.    Patient states her IC is doing well.  Denies any bladder pain.  Denies any bladder pressure.  Denies any bladder spasms.  Denies any pain or burning urination denies any odor urine.  Denies any fever or body aches.  She does have history of frequency, urgency, and urge incontinence.    She has had Botox injections in the past which were successful.   She states she is currently having worsening symptoms.  He is going through about 6 pads per day.    She was considering Botox again.      No other urinary complaints at this time.           Past Medical History:   Past Medical History:   Diagnosis Date    Atrophic vaginitis     Depression     Diabetes     GERD (gastroesophageal reflux disease)     Hiatal hernia     Hyperlipidemia     Hypertension     IC (interstitial cystitis)     Menopausal syndrome     Peripheral neuropathy     Urinary incontinence For years    Vaginal pain        Past Surgical Historical:   Past Surgical History:   Procedure Laterality Date    ABDOMINAL SURGERY      Laporouscapy    APPENDECTOMY  1993    BACK SURGERY      BILATERAL SALPINGOOPHORECTOMY      bladder sling amp repair      BLADDER SUSPENSION  2012    Bladder sling    COLPOSCOPY  1/22    DIAGNOSTIC LAPAROSCOPY      ESOPHAGEAL DILATION      NEURO STIMULATOR IMPLANT      PELVIC LAPAROSCOPY      SPINAL FUSION      SI right joint    TONSILLECTOMY AND ADENOIDECTOMY      TOTAL ABDOMINAL HYSTERECTOMY      TOTAL HIP ARTHROPLASTY      bilat    TUBAL LIGATION  1983    WISDOM TOOTH EXTRACTION          Medications:   Medication List with Changes/Refills   Current Medications    DULAGLUTIDE (TRULICITY) 4.5 MG/0.5 ML PEN INJECTOR        ESTRADIOL (ESTRACE) 2  MG TABLET    Take 1 tablet (2 mg total) by mouth once daily.    EZETIMIBE (ZETIA) 10 MG TABLET        FENOFIBRATE 160 MG TAB    Take 160 mg by mouth once daily.    FERROUS SULFATE (FEOSOL) 325 MG (65 MG IRON) TAB TABLET    Take 1 tablet by mouth once daily.    GABAPENTIN (NEURONTIN) 800 MG TABLET    Take 800 mg by mouth 3 (three) times daily.    JARDIANCE 25 MG TAB    Take 1 tablet by mouth once daily.    METFORMIN (GLUCOPHAGE) 1000 MG TABLET    Take 1,000 mg by mouth 2 (two) times daily.    METOPROLOL SUCCINATE (TOPROL-XL) 50 MG 24 HR TABLET    Take 50 mg by mouth once daily.    PANTOPRAZOLE (PROTONIX) 40 MG TABLET    TAKE 1 TABLET BY MOUTH ONCE DAILY 1 2 HOUR PRIOR TO MORNING MEAL ON AN EMPTY STOMACH    ROPINIROLE (REQUIP) 4 MG TABLET    TAKE 2 TABLETS BY MOUTH ONCE DAILY AT NIGHT    SIMVASTATIN (ZOCOR) 80 MG TABLET    Take 80 mg by mouth nightly.    TRAZODONE (DESYREL) 50 MG TABLET    TAKE 1/2 (ONE-HALF) TABLET BY MOUTH AT BEDTIME FOR 7 DAYS, THEN START TAKING 1 TABLET    VALSARTAN (DIOVAN) 80 MG TABLET    Take 80 mg by mouth every evening.   Changed and/or Refilled Medications    Modified Medication Previous Medication    AMITRIPTYLINE (ELAVIL) 50 MG TABLET amitriptyline (ELAVIL) 50 MG tablet       Take 1 tablet (50 mg total) by mouth every evening.    Take 1 tablet (50 mg total) by mouth every evening.   Discontinued Medications    GABAPENTIN (NEURONTIN) 600 MG TABLET    Take 600 mg by mouth 3 (three) times daily.    HYDROCHLOROTHIAZIDE (HYDRODIURIL) 25 MG TABLET        ROPINIROLE (REQUIP) 2 MG TABLET    TAKE 2 TABLETS BY MOUTH ONCE DAILY AT NIGHT    SITAGLIPTAN-METFORMIN (JANUMET) 50-1,000 MG PER TABLET    Take 1 tablet by mouth 2 (two) times daily with meals.        Past Social History:   Social History     Socioeconomic History    Marital status:    Tobacco Use    Smoking status: Never    Smokeless tobacco: Never   Substance and Sexual Activity    Alcohol use: Not Currently    Drug use: Yes      Comment: Pain Management    Sexual activity: Not Currently     Partners: Male     Birth control/protection: Post-menopausal     Comment: Hysterectomy       Allergies: Review of patient's allergies indicates:  No Known Allergies     Family History:   Family History   Problem Relation Name Age of Onset    Prostate cancer Father Tho 71    Cancer Father Tho     Hypertension Mother Evy Arroyo     Heart disease Mother Evy Arroyo     Asthma Mother Evy Arroyo     Heart failure Mother Evy Arroyo     Osteoarthritis Mother Evy Arroyo     Diabetes Mother Evy Arroyo     Breast cancer Paternal Aunt Aurora Main    Breast cancer Paternal Aunt Mechelle     Breast cancer Paternal Aunt Venessa     Diabetes Sister Jojo     Diabetes Sister Tarsha         Review of Systems:  Review of Systems   Constitutional:  Negative for activity change and appetite change.   HENT:  Negative for congestion and dental problem.    Respiratory:  Negative for chest tightness and shortness of breath.    Cardiovascular:  Negative for chest pain.   Gastrointestinal:  Negative for abdominal distention and abdominal pain.   Genitourinary:  Positive for frequency and urgency. Negative for decreased urine volume, difficulty urinating, dyspareunia, dysuria, enuresis, flank pain, genital sores, hematuria and pelvic pain.   Musculoskeletal:  Negative for back pain and neck pain.   Allergic/Immunologic: Negative for immunocompromised state.   Neurological:  Negative for dizziness.   Hematological:  Negative for adenopathy.   Psychiatric/Behavioral:  Negative for agitation, behavioral problems and confusion.        Physical Exam:  Physical Exam  Vitals and nursing note reviewed.   Constitutional:       Appearance: She is well-developed.   HENT:      Head: Normocephalic.   Eyes:      Pupils: Pupils are equal, round, and reactive to light.   Cardiovascular:      Rate and Rhythm: Normal rate and regular rhythm.      Heart sounds: Normal heart  sounds.   Pulmonary:      Effort: Pulmonary effort is normal.      Breath sounds: Normal breath sounds.   Abdominal:      General: Bowel sounds are normal.      Palpations: Abdomen is soft.   Musculoskeletal:         General: Normal range of motion.      Cervical back: Normal range of motion and neck supple.   Skin:     General: Skin is warm and dry.   Neurological:      Mental Status: She is alert and oriented to person, place, and time.   Psychiatric:         Mood and Affect: Mood normal.         Behavior: Behavior normal.       Urinalysis: Glucose greater than 1000.    Bladder scan: 61 cc    Assessment/Plan:   1. Interstitial cystitis: Patient is doing well with IC diet and Elavil 50 mg daily.    Refill of Elavil sent to pharmacy.      2. Frequency/urgency/urge incontinence:  Patient has had worsening symptoms.    She was interested in repeating Botox.  However, she wants to think about it a little bit.    She will notify us if she would like to proceed.  If so we will arrange Botox injections with Dr. Wheeler.      Follow-up 1 year, sooner if needed.  Problem List Items Addressed This Visit    None  Visit Diagnoses       Interstitial cystitis    -  Primary    Relevant Medications    amitriptyline (ELAVIL) 50 MG tablet    Other Relevant Orders    POCT Urinalysis(Instrument)    POCT Bladder Scan    Urinary frequency        Relevant Orders    POCT Urinalysis(Instrument)    POCT Bladder Scan    Urinary urgency        Relevant Orders    POCT Urinalysis(Instrument)    POCT Bladder Scan    Urge incontinence        Relevant Orders    POCT Urinalysis(Instrument)    POCT Bladder Scan

## 2024-10-18 DIAGNOSIS — B37.31 YEAST INFECTION OF THE VAGINA: Primary | ICD-10-CM

## 2024-10-18 RX ORDER — FLUCONAZOLE 150 MG/1
150 TABLET ORAL EVERY OTHER DAY
Qty: 2 TABLET | Refills: 0 | Status: SHIPPED | OUTPATIENT
Start: 2024-10-18 | End: 2024-10-21

## 2024-10-18 RX ORDER — FLUCONAZOLE 150 MG/1
TABLET ORAL
Qty: 2 TABLET | Refills: 0 | OUTPATIENT
Start: 2024-10-18

## 2024-10-21 ENCOUNTER — TELEPHONE (OUTPATIENT)
Dept: OBSTETRICS AND GYNECOLOGY | Facility: CLINIC | Age: 69
End: 2024-10-21
Payer: MEDICARE

## 2024-10-21 ENCOUNTER — PATIENT MESSAGE (OUTPATIENT)
Dept: OBSTETRICS AND GYNECOLOGY | Facility: CLINIC | Age: 69
End: 2024-10-21
Payer: MEDICARE

## 2024-10-21 DIAGNOSIS — B37.31 VAGINAL YEAST INFECTION: Primary | ICD-10-CM

## 2024-10-21 RX ORDER — TERCONAZOLE 8 MG/G
1 CREAM VAGINAL NIGHTLY
Qty: 20 G | Refills: 0 | Status: SHIPPED | OUTPATIENT
Start: 2024-10-21

## 2024-10-21 NOTE — TELEPHONE ENCOUNTER
----- Message from Med Assistant Leticia sent at 10/18/2024  4:13 PM CDT -----  Regarding: Fluconazole and Statin drugs  This is an Alice pt w/ yeast infection symptoms. [facility] pharmacy faxed a note to make us aware that this patient is on a statin drug and the blood level of statin can be increased by the fluconazole. Should we call out Gynazole instead?

## 2024-11-04 ENCOUNTER — PATIENT MESSAGE (OUTPATIENT)
Dept: OBSTETRICS AND GYNECOLOGY | Facility: CLINIC | Age: 69
End: 2024-11-04
Payer: MEDICARE

## 2024-12-17 ENCOUNTER — OFFICE VISIT (OUTPATIENT)
Dept: OBSTETRICS AND GYNECOLOGY | Facility: CLINIC | Age: 69
End: 2024-12-17
Payer: MEDICARE

## 2024-12-17 VITALS
HEART RATE: 86 BPM | SYSTOLIC BLOOD PRESSURE: 137 MMHG | WEIGHT: 171.38 LBS | DIASTOLIC BLOOD PRESSURE: 78 MMHG | BODY MASS INDEX: 27.66 KG/M2

## 2024-12-17 DIAGNOSIS — Z12.31 ENCOUNTER FOR SCREENING MAMMOGRAM FOR MALIGNANT NEOPLASM OF BREAST: ICD-10-CM

## 2024-12-17 DIAGNOSIS — N90.4 LICHEN SCLEROSUS OF FEMALE GENITALIA: ICD-10-CM

## 2024-12-17 DIAGNOSIS — Z01.419 GYNECOLOGIC EXAM NORMAL: Primary | ICD-10-CM

## 2024-12-17 PROCEDURE — G0101 CA SCREEN;PELVIC/BREAST EXAM: HCPCS | Mod: S$PBB,,, | Performed by: NURSE PRACTITIONER

## 2024-12-17 RX ORDER — CLOBETASOL PROPIONATE 0.5 MG/G
OINTMENT TOPICAL
Qty: 60 G | Refills: 1 | Status: SHIPPED | OUTPATIENT
Start: 2024-12-17

## 2024-12-17 NOTE — PROGRESS NOTES
Subjective     Patient ID: Erika Espinosa is a 69 y.o. female.    Chief Complaint:  Well Woman (Pt c/o recurrent yeast infections for about a year now and bladder leakage )      History of Present Illness  HPI  Annual Exam-Postmenopausal  Patient presents for annual exam. The patient has no complaints today. The patient is not currently sexually active. GYN screening history: last pap: was normal and last mammogram: was normal. The patient is taking hormone replacement therapy. Patient denies post-menopausal vaginal bleeding. She is still experiencing vaginal itching and irritation. Her last A1c was 6.9 and her FBG is 120-135. She is on jardiance.    Outpatient Medications Marked as Taking for the 12/17/24 encounter (Office Visit) with Alice Blas NP   Medication Sig Dispense Refill    amitriptyline (ELAVIL) 50 MG tablet Take 1 tablet (50 mg total) by mouth every evening. 30 tablet 11    estradioL (ESTRACE) 2 MG tablet Take 1 tablet (2 mg total) by mouth once daily. 30 tablet 11    ezetimibe (ZETIA) 10 mg tablet       fenofibrate 160 MG Tab Take 160 mg by mouth once daily.  1    gabapentin (NEURONTIN) 800 MG tablet Take 800 mg by mouth 3 (three) times daily.      JARDIANCE 25 mg Tab Take 1 tablet by mouth once daily.      metFORMIN (GLUCOPHAGE) 1000 MG tablet Take 1,000 mg by mouth 2 (two) times daily.      rOPINIRole (REQUIP) 4 MG tablet TAKE 2 TABLETS BY MOUTH ONCE DAILY AT NIGHT      traZODone (DESYREL) 50 MG tablet TAKE 1/2 (ONE-HALF) TABLET BY MOUTH AT BEDTIME FOR 7 DAYS, THEN START TAKING 1 TABLET      valsartan (DIOVAN) 80 MG tablet Take 80 mg by mouth every evening.       Vitals:    12/17/24 0959   BP: 137/78   Pulse: 86   Weight: 77.7 kg (171 lb 6.4 oz)     Past Medical History:   Diagnosis Date    Atrophic vaginitis     Depression     Diabetes     GERD (gastroesophageal reflux disease)     Hiatal hernia     Hyperlipidemia     Hypertension     IC (interstitial cystitis)     Menopausal syndrome      Osteoporosis     Peripheral neuropathy     Urinary incontinence For years    Vaginal pain      Past Surgical History:   Procedure Laterality Date    ABDOMINAL SURGERY      Laporouscapy    APPENDECTOMY      BACK SURGERY      BILATERAL SALPINGOOPHORECTOMY      bladder sling amp repair      BLADDER SUSPENSION      Bladder sling    COLPOSCOPY      DIAGNOSTIC LAPAROSCOPY      ESOPHAGEAL DILATION      NEURO STIMULATOR IMPLANT      PELVIC LAPAROSCOPY      SPINAL FUSION      SI right joint    TONSILLECTOMY AND ADENOIDECTOMY      TOTAL ABDOMINAL HYSTERECTOMY      TOTAL HIP ARTHROPLASTY      bilat    TUBAL LIGATION      WISDOM TOOTH EXTRACTION         GYN & OB History  No LMP recorded. Patient has had a hysterectomy.   Date of Last Pap: 2023    OB History    Para Term  AB Living   2 2       2   SAB IAB Ectopic Multiple Live Births                  # Outcome Date GA Lbr Speedy/2nd Weight Sex Type Anes PTL Lv   2 Para      Vag-Spont      1 Para      Vag-Spont          Review of Systems  Review of Systems   Constitutional:  Negative for activity change, appetite change, chills, fatigue and fever.   HENT:  Negative for nasal congestion and tinnitus.    Eyes:  Negative for visual disturbance.   Respiratory:  Negative for cough and shortness of breath.    Cardiovascular:  Negative for chest pain and palpitations.   Gastrointestinal:  Negative for abdominal pain, bloating, blood in stool, constipation, nausea and vomiting.   Endocrine: Negative for diabetes, hair loss and hot flashes.   Genitourinary:  Positive for vaginal dryness (irritation and itching). Negative for bladder incontinence, decreased libido, dysmenorrhea, dyspareunia, dysuria, flank pain, frequency, genital sores, hematuria, hot flashes, menorrhagia, menstrual problem, pelvic pain, urgency, vaginal bleeding, vaginal discharge, vaginal pain, urinary incontinence, postcoital bleeding, postmenopausal bleeding and vaginal odor.    Musculoskeletal:  Negative for arthralgias, back pain, leg pain and myalgias.   Integumentary:  Negative for rash, acne, hair changes, mole/lesion, breast mass, nipple discharge, breast skin changes and breast tenderness.   Neurological:  Negative for vertigo, syncope, numbness and headaches.   Hematological:  Does not bruise/bleed easily.   Psychiatric/Behavioral:  Negative for depression and sleep disturbance. The patient is not nervous/anxious.    Breast: Negative for asymmetry, lump, mass, mastodynia, nipple discharge, skin changes and tenderness         Objective   Physical Exam:   Constitutional: She is oriented to person, place, and time. She appears well-developed and well-nourished.    HENT:   Nose: No epistaxis.      Cardiovascular:  Normal rate, regular rhythm and normal heart sounds.             Pulmonary/Chest: Effort normal and breath sounds normal.        Abdominal: Soft.     Genitourinary:    Inguinal canal, urethra, bladder, vagina and rectum normal.            Pelvic exam was performed with patient supine.   The external female genitalia was abnormal.   External genitalia lesions present.  (LS, white)  Labial bartholins normal.There is an absent right adnexa and an absent left adnexa. Cervix is absent.Uterus is absent. Normal urethral meatus.          Musculoskeletal: Normal range of motion and moves all extremeties.       Neurological: She is alert and oriented to person, place, and time.    Skin: Skin is warm and dry.    Psychiatric: She has a normal mood and affect. Her behavior is normal. Judgment and thought content normal.       Female chaperone present for exam       Assessment and Plan     1. Gynecologic exam normal    2. Lichen sclerosus of female genitalia    3. Encounter for screening mammogram for malignant neoplasm of breast             Plan:  Gynecologic exam normal  Patient was counseled today on A.C.S. Pap guidelines and recommendations for yearly pelvic exams, mammograms and  monthly self breast exams; to see her PCP for other health maintenance.     Lichen sclerosus of female genitalia  -     clobetasol 0.05% (TEMOVATE) 0.05 % Oint; Apply topically to affected area BID for one month then once a day to affected area for two months  Dispense: 60 g; Refill: 1  Patient was instructed to use the steroid cream as directed and to also use the Premarin cream as add back therapy to avoid permanent thinning the vaginal tissue.  Patient verbalized understanding    Encounter for screening mammogram for malignant neoplasm of breast  -     Mammo Digital Screening Bilat w/ Jose; Future; Expected date: 12/17/2024       Follow up in about 1 year (around 12/17/2025).

## 2024-12-19 ENCOUNTER — PATIENT MESSAGE (OUTPATIENT)
Dept: OBSTETRICS AND GYNECOLOGY | Facility: CLINIC | Age: 69
End: 2024-12-19
Payer: MEDICARE

## 2025-02-26 DIAGNOSIS — N95.1 MENOPAUSE SYNDROME: ICD-10-CM

## 2025-02-26 DIAGNOSIS — N90.4 LICHEN SCLEROSUS OF FEMALE GENITALIA: ICD-10-CM

## 2025-02-27 RX ORDER — CLOBETASOL PROPIONATE 0.5 MG/G
OINTMENT TOPICAL
Qty: 60 G | Refills: 1 | Status: SHIPPED | OUTPATIENT
Start: 2025-02-27

## 2025-02-27 RX ORDER — ESTRADIOL 2 MG/1
2 TABLET ORAL DAILY
Qty: 30 TABLET | Refills: 0 | Status: SHIPPED | OUTPATIENT
Start: 2025-02-27 | End: 2026-02-27

## 2025-03-18 ENCOUNTER — OFFICE VISIT (OUTPATIENT)
Dept: OBSTETRICS AND GYNECOLOGY | Facility: CLINIC | Age: 70
End: 2025-03-18
Payer: MEDICARE

## 2025-03-18 VITALS
DIASTOLIC BLOOD PRESSURE: 67 MMHG | WEIGHT: 175.38 LBS | SYSTOLIC BLOOD PRESSURE: 143 MMHG | HEIGHT: 66 IN | BODY MASS INDEX: 28.19 KG/M2 | HEART RATE: 94 BPM

## 2025-03-18 DIAGNOSIS — L90.0 LICHEN SCLEROSUS: ICD-10-CM

## 2025-03-18 DIAGNOSIS — N39.0 ACUTE UTI (URINARY TRACT INFECTION): Primary | ICD-10-CM

## 2025-03-18 DIAGNOSIS — B37.31 YEAST INFECTION OF THE VAGINA: ICD-10-CM

## 2025-03-18 LAB
BILIRUB SERPL-MCNC: NEGATIVE MG/DL
BLOOD URINE, POC: NORMAL
CLARITY, POC UA: NORMAL
COLOR, POC UA: YELLOW
GLUCOSE UR QL STRIP: NORMAL
KETONES UR QL STRIP: NEGATIVE
LEUKOCYTE ESTERASE URINE, POC: NORMAL
NITRITE, POC UA: NEGATIVE
PH, POC UA: 6.5
PROTEIN, POC: NORMAL
SPECIFIC GRAVITY, POC UA: >=1.03
UROBILINOGEN, POC UA: NORMAL

## 2025-03-18 PROCEDURE — 99214 OFFICE O/P EST MOD 30 MIN: CPT | Mod: S$PBB,,, | Performed by: NURSE PRACTITIONER

## 2025-03-18 RX ORDER — CLOTRIMAZOLE AND BETAMETHASONE DIPROPIONATE 10; .64 MG/G; MG/G
CREAM TOPICAL 2 TIMES DAILY
Qty: 45 G | Refills: 0 | Status: SHIPPED | OUTPATIENT
Start: 2025-03-18 | End: 2025-03-25

## 2025-03-18 RX ORDER — METOPROLOL SUCCINATE 50 MG/1
50 TABLET, EXTENDED RELEASE ORAL
COMMUNITY
Start: 2025-02-06

## 2025-03-18 RX ORDER — FLUCONAZOLE 150 MG/1
150 TABLET ORAL EVERY OTHER DAY
Qty: 2 TABLET | Refills: 0 | Status: SHIPPED | OUTPATIENT
Start: 2025-03-18 | End: 2025-03-21

## 2025-03-18 RX ORDER — NITROFURANTOIN 25; 75 MG/1; MG/1
100 CAPSULE ORAL 2 TIMES DAILY
Qty: 14 CAPSULE | Refills: 0 | Status: SHIPPED | OUTPATIENT
Start: 2025-03-18 | End: 2025-03-25

## 2025-03-18 RX ORDER — SEMAGLUTIDE 0.68 MG/ML
INJECTION, SOLUTION SUBCUTANEOUS
COMMUNITY

## 2025-03-18 RX ORDER — SIMVASTATIN 80 MG/1
80 TABLET, FILM COATED ORAL NIGHTLY
COMMUNITY
Start: 2025-03-04

## 2025-03-18 NOTE — PROGRESS NOTES
Subjective     Patient ID: Erika Espinosa is a 69 y.o. female.    Chief Complaint:  Follow-up (FU ON LS and c/o pain when urinating since yesterday)      History of Present Illness  Follow-up  Pertinent negatives include no abdominal pain, arthralgias, chest pain, chills, congestion, coughing, fatigue, fever, headaches, myalgias, nausea, numbness, rash, vertigo or vomiting.     Patient presents to the clinic for a follow up on lichen sclerosis.  She reports that she has periods when she feels like it is getting better and then it will flare up again for 3 or 4 days.  She reports right now that she is having significant itching and irritation that is causing major discomfort.  She denies any vaginal discharge.    Outpatient Medications Marked as Taking for the 3/18/25 encounter (Office Visit) with Alice Blas NP   Medication Sig Dispense Refill    amitriptyline (ELAVIL) 50 MG tablet Take 1 tablet (50 mg total) by mouth every evening. 30 tablet 11    clobetasol 0.05% (TEMOVATE) 0.05 % Oint Apply topically to affected area BID for one month then once a day to affected area for two months 60 g 1    estradioL (ESTRACE) 2 MG tablet Take 1 tablet (2 mg total) by mouth once daily. 30 tablet 0    ezetimibe (ZETIA) 10 mg tablet       fenofibrate 160 MG Tab Take 160 mg by mouth once daily.  1    gabapentin (NEURONTIN) 800 MG tablet Take 800 mg by mouth 3 (three) times daily.      JARDIANCE 25 mg Tab Take 1 tablet by mouth once daily.      metFORMIN (GLUCOPHAGE) 1000 MG tablet Take 1,000 mg by mouth 2 (two) times daily.      metoprolol succinate (TOPROL-XL) 50 MG 24 hr tablet Take 50 mg by mouth.      rOPINIRole (REQUIP) 4 MG tablet TAKE 2 TABLETS BY MOUTH ONCE DAILY AT NIGHT      semaglutide (OZEMPIC) 0.25 mg or 0.5 mg (2 mg/3 mL) pen injector Inject into the skin every 7 days.      simvastatin (ZOCOR) 80 MG tablet Take 80 mg by mouth every evening.      traZODone (DESYREL) 50 MG tablet TAKE 1/2 (ONE-HALF) TABLET BY MOUTH  "AT BEDTIME FOR 7 DAYS, THEN START TAKING 1 TABLET      valsartan (DIOVAN) 80 MG tablet Take 80 mg by mouth every evening.       Vitals:    25 1025   BP: (!) 143/67   BP Location: Left forearm   Patient Position: Sitting   Pulse: 94   Weight: 79.6 kg (175 lb 6.4 oz)   Height: 5' 6" (1.676 m)     Past Medical History:   Diagnosis Date    Atrophic vaginitis     Depression     Diabetes     GERD (gastroesophageal reflux disease)     Hiatal hernia     Hyperlipidemia     Hypertension     IC (interstitial cystitis)     Menopausal syndrome     Osteoporosis     Peripheral neuropathy     Urinary incontinence For years    Vaginal pain      Past Surgical History:   Procedure Laterality Date    ABDOMINAL SURGERY      Laporouscapy    APPENDECTOMY      BACK SURGERY      BILATERAL SALPINGOOPHORECTOMY      bladder sling amp repair      BLADDER SUSPENSION      Bladder sling    COLPOSCOPY      DIAGNOSTIC LAPAROSCOPY      ESOPHAGEAL DILATION      NEURO STIMULATOR IMPLANT      PELVIC LAPAROSCOPY      SPINAL FUSION      SI right joint    TONSILLECTOMY AND ADENOIDECTOMY      TOTAL ABDOMINAL HYSTERECTOMY      TOTAL HIP ARTHROPLASTY      bilat    TUBAL LIGATION      WISDOM TOOTH EXTRACTION         GYN & OB History  No LMP recorded. Patient has had a hysterectomy.   Date of Last Pap: 2023    OB History    Para Term  AB Living   2 2    2   SAB IAB Ectopic Multiple Live Births             # Outcome Date GA Lbr Speedy/2nd Weight Sex Type Anes PTL Lv   2 Para      Vag-Spont      1 Para      Vag-Spont          Review of Systems  Review of Systems   Constitutional:  Negative for activity change, appetite change, chills, fatigue and fever.   HENT:  Negative for nasal congestion and tinnitus.    Eyes:  Negative for visual disturbance.   Respiratory:  Negative for cough and shortness of breath.    Cardiovascular:  Negative for chest pain and palpitations.   Gastrointestinal:  Negative for abdominal pain, " bloating, blood in stool, constipation, nausea and vomiting.   Endocrine: Negative for diabetes, hair loss and hot flashes.   Genitourinary:  Positive for vaginal pain. Negative for bladder incontinence, decreased libido, dysmenorrhea, dyspareunia, dysuria, flank pain, frequency, genital sores, hematuria, hot flashes, menorrhagia, menstrual problem, pelvic pain, urgency, vaginal bleeding, vaginal discharge, urinary incontinence, postcoital bleeding, postmenopausal bleeding, vaginal dryness and vaginal odor.   Musculoskeletal:  Negative for arthralgias, back pain, leg pain and myalgias.   Integumentary:  Negative for rash, acne, hair changes, mole/lesion, breast mass, nipple discharge, breast skin changes and breast tenderness.   Neurological:  Negative for vertigo, syncope, numbness and headaches.   Hematological:  Does not bruise/bleed easily.   Psychiatric/Behavioral:  Negative for depression and sleep disturbance. The patient is not nervous/anxious.    Breast: Negative for asymmetry, lump, mass, mastodynia, nipple discharge, skin changes and tenderness         Objective   Physical Exam:    HENT:   Nose: No epistaxis.              Genitourinary:    Inguinal canal, urethra, bladder, vagina and rectum normal.      Pelvic exam was performed with patient supine.   The external female genitalia was normal.     Labial bartholins normal.There is an absent right adnexa and an absent left adnexa. Cervix is absent.Uterus is absent. Normal urethral meatus.   Genitourinary Comments: Female chaperone present for exam                              Assessment and Plan     1. Acute UTI (urinary tract infection)    2. Lichen sclerosus    3. Yeast infection of the vagina             Plan:  Acute UTI (urinary tract infection)  -     POCT urine dipstick without microscope  -     nitrofurantoin, macrocrystal-monohydrate, (MACROBID) 100 MG capsule; Take 1 capsule (100 mg total) by mouth 2 (two) times daily. for 7 days  Dispense: 14  capsule; Refill: 0  Patient instructed to:   Increase oral fluid intake  Avoid consumption of bladder irritants such as, alcohol, carbonated beverages, etc.   Void immediately after intercourse if sexually active   Empty bladder at regular and frequent intervals    Patient education provided on the following topics:  Maintaining proper perineal hygiene  Taking showers versus baths  Avoiding heavily perfumed soaps and using a milder soap, such as Dove or Dial  Wearing loose non-restrictive clothing  Wearing cotton underwear  Use of Pyridium OTC as needed for symptom relief   Importance of completing the ENTIRE course of the prescribed antibiotic, even if symptoms resolve before, to ensure eradication of infection and assisting in prevention of recurrent infection and antimicrobial resistance       Lichen sclerosus  Once the yeast infection clears up continue to do the topical steroid therapy as well as the estrogen add back therapy.    Yeast infection of the vagina  -     fluconazole (DIFLUCAN) 150 MG Tab; Take 1 tablet (150 mg total) by mouth every other day. for 2 doses  Dispense: 2 tablet; Refill: 0  -     clotrimazole-betamethasone 1-0.05% (LOTRISONE) cream; Apply topically 2 (two) times daily. for 7 days  Dispense: 45 g; Refill: 0    Keep the vaginal area clean and dry. Warm moist environments are a good breeding ground for yeast. Shower promptly after exercising. Wear cotton underwear and loose fitting clothes on the bottom. If you swim, try not to stay in a wet bathing suit too long. Decreasing carbs and sugar in the diet can also prevent yeast infections.     Follow up in about 4 weeks (around 4/15/2025), or punch bx.

## 2025-03-20 ENCOUNTER — PATIENT MESSAGE (OUTPATIENT)
Dept: OBSTETRICS AND GYNECOLOGY | Facility: CLINIC | Age: 70
End: 2025-03-20
Payer: MEDICARE

## 2025-03-20 DIAGNOSIS — N95.1 MENOPAUSE SYNDROME: ICD-10-CM

## 2025-03-20 RX ORDER — ESTRADIOL 2 MG/1
2 TABLET ORAL DAILY
Qty: 30 TABLET | Refills: 0 | Status: SHIPPED | OUTPATIENT
Start: 2025-03-20 | End: 2026-03-20

## 2025-03-24 ENCOUNTER — DOCUMENTATION ONLY (OUTPATIENT)
Dept: OBSTETRICS AND GYNECOLOGY | Facility: CLINIC | Age: 70
End: 2025-03-24
Payer: MEDICARE

## 2025-04-21 DIAGNOSIS — B37.31 YEAST INFECTION OF THE VAGINA: ICD-10-CM

## 2025-04-21 RX ORDER — FLUCONAZOLE 150 MG/1
TABLET ORAL
Qty: 2 TABLET | Refills: 0 | OUTPATIENT
Start: 2025-04-21

## 2025-04-25 DIAGNOSIS — N95.1 MENOPAUSE SYNDROME: ICD-10-CM

## 2025-04-25 RX ORDER — ESTRADIOL 2 MG/1
2 TABLET ORAL
Qty: 30 TABLET | Refills: 0 | Status: SHIPPED | OUTPATIENT
Start: 2025-04-25

## 2025-04-29 ENCOUNTER — PATIENT MESSAGE (OUTPATIENT)
Dept: OBSTETRICS AND GYNECOLOGY | Facility: CLINIC | Age: 70
End: 2025-04-29
Payer: MEDICARE

## 2025-04-29 DIAGNOSIS — L90.0 LICHEN SCLEROSUS: Primary | ICD-10-CM

## 2025-04-29 RX ORDER — DIAZEPAM 10 MG/1
10 TABLET ORAL DAILY
Qty: 1 TABLET | Refills: 0 | Status: SHIPPED | OUTPATIENT
Start: 2025-04-29

## 2025-05-06 ENCOUNTER — PROCEDURE VISIT (OUTPATIENT)
Dept: OBSTETRICS AND GYNECOLOGY | Facility: CLINIC | Age: 70
End: 2025-05-06
Payer: MEDICARE

## 2025-05-06 VITALS
WEIGHT: 169 LBS | DIASTOLIC BLOOD PRESSURE: 78 MMHG | BODY MASS INDEX: 27.28 KG/M2 | HEART RATE: 97 BPM | SYSTOLIC BLOOD PRESSURE: 132 MMHG

## 2025-05-06 DIAGNOSIS — N76.3 CHRONIC VULVITIS: ICD-10-CM

## 2025-05-06 DIAGNOSIS — N95.2 ATROPHIC VAGINITIS: Primary | ICD-10-CM

## 2025-05-06 PROCEDURE — 99499 UNLISTED E&M SERVICE: CPT | Mod: S$PBB,,, | Performed by: OBSTETRICS & GYNECOLOGY

## 2025-05-06 PROCEDURE — 56605 BIOPSY OF VULVA/PERINEUM: CPT | Mod: S$PBB,,, | Performed by: OBSTETRICS & GYNECOLOGY

## 2025-05-06 RX ORDER — ESTRADIOL 0.1 MG/G
1 CREAM VAGINAL
Qty: 42.5 G | Refills: 1 | Status: SHIPPED | OUTPATIENT
Start: 2025-05-08 | End: 2025-05-08

## 2025-05-06 RX ORDER — SEMAGLUTIDE 2.68 MG/ML
INJECTION, SOLUTION SUBCUTANEOUS
COMMUNITY

## 2025-05-06 NOTE — PROCEDURES
Biopsy (Gynecological)    Date/Time: 5/6/2025 3:40 PM    Performed by: Lilibeth Hood MD  Authorized by: Lilibeth Hood MD     Patient was prepped and draped in the normal sterile fashion.  Local anesthesia used?: Yes    Anesthesia:  Local infiltration  Local anesthetic:  Lidocaine 2% with epinephrine  Anesthetic total (ml):  3    Biopsy Location:  Vulva  Vulva:     # of lesions:  1   Patient tolerated the procedure well with no immediate complications.  Three 2-0 chromic sutures were used to achieve hemostasis

## 2025-05-07 ENCOUNTER — PATIENT MESSAGE (OUTPATIENT)
Dept: OBSTETRICS AND GYNECOLOGY | Facility: CLINIC | Age: 70
End: 2025-05-07
Payer: MEDICARE

## 2025-05-07 DIAGNOSIS — N76.3 CHRONIC VULVITIS: Primary | ICD-10-CM

## 2025-05-09 ENCOUNTER — RESULTS FOLLOW-UP (OUTPATIENT)
Dept: OBSTETRICS AND GYNECOLOGY | Facility: CLINIC | Age: 70
End: 2025-05-09

## 2025-05-09 ENCOUNTER — PATIENT MESSAGE (OUTPATIENT)
Dept: OBSTETRICS AND GYNECOLOGY | Facility: CLINIC | Age: 70
End: 2025-05-09
Payer: MEDICARE

## 2025-05-09 RX ORDER — FLUCONAZOLE 150 MG/1
150 TABLET ORAL EVERY OTHER DAY
Qty: 2 TABLET | Refills: 0 | Status: SHIPPED | OUTPATIENT
Start: 2025-05-09 | End: 2025-05-12

## 2025-05-27 DIAGNOSIS — N95.1 MENOPAUSE SYNDROME: ICD-10-CM

## 2025-05-27 RX ORDER — ESTRADIOL 2 MG/1
2 TABLET ORAL DAILY
Qty: 30 TABLET | Refills: 11 | Status: SHIPPED | OUTPATIENT
Start: 2025-05-27

## 2025-06-10 ENCOUNTER — PATIENT MESSAGE (OUTPATIENT)
Dept: OBSTETRICS AND GYNECOLOGY | Facility: CLINIC | Age: 70
End: 2025-06-10
Payer: MEDICARE

## 2025-06-10 DIAGNOSIS — N90.4 LICHEN SCLEROSUS OF FEMALE GENITALIA: ICD-10-CM

## 2025-06-10 RX ORDER — CLOBETASOL PROPIONATE 0.5 MG/G
OINTMENT TOPICAL 2 TIMES DAILY
Qty: 30 G | Refills: 0 | Status: SHIPPED | OUTPATIENT
Start: 2025-06-10 | End: 2025-06-17

## 2025-06-13 ENCOUNTER — PATIENT MESSAGE (OUTPATIENT)
Dept: OBSTETRICS AND GYNECOLOGY | Facility: CLINIC | Age: 70
End: 2025-06-13
Payer: MEDICARE

## 2025-06-13 RX ORDER — FLUCONAZOLE 150 MG/1
150 TABLET ORAL EVERY OTHER DAY
Qty: 2 TABLET | Refills: 0 | Status: SHIPPED | OUTPATIENT
Start: 2025-06-13 | End: 2025-06-16

## 2025-08-23 ENCOUNTER — PATIENT MESSAGE (OUTPATIENT)
Dept: RESEARCH | Facility: HOSPITAL | Age: 70
End: 2025-08-23
Payer: MEDICARE

## 2025-08-29 DIAGNOSIS — N90.4 LICHEN SCLEROSUS OF FEMALE GENITALIA: ICD-10-CM

## 2025-09-02 RX ORDER — CLOBETASOL PROPIONATE 0.5 MG/G
OINTMENT TOPICAL 2 TIMES DAILY
Qty: 30 G | Refills: 0 | Status: SHIPPED | OUTPATIENT
Start: 2025-09-02 | End: 2025-09-09